# Patient Record
Sex: MALE | Race: WHITE | Employment: OTHER | URBAN - NONMETROPOLITAN AREA
[De-identification: names, ages, dates, MRNs, and addresses within clinical notes are randomized per-mention and may not be internally consistent; named-entity substitution may affect disease eponyms.]

---

## 2018-10-12 ENCOUNTER — HOSPITAL ENCOUNTER (INPATIENT)
Age: 68
LOS: 1 days | Discharge: HOME | DRG: 247 | End: 2018-10-13
Attending: FAMILY MEDICINE | Admitting: INTERNAL MEDICINE
Payer: MEDICARE

## 2018-10-12 ENCOUNTER — APPOINTMENT (OUTPATIENT)
Dept: GENERAL RADIOLOGY | Age: 68
DRG: 247 | End: 2018-10-12
Payer: MEDICARE

## 2018-10-12 DIAGNOSIS — I21.3 ST ELEVATION MYOCARDIAL INFARCTION (STEMI), UNSPECIFIED ARTERY (HCC): Primary | ICD-10-CM

## 2018-10-12 LAB
ACTIVATED CLOTTING TIME: 406 SECONDS (ref 1–150)
ALBUMIN SERPL-MCNC: 4.6 G/DL (ref 3.5–5.1)
ALP BLD-CCNC: 62 U/L (ref 38–126)
ALT SERPL-CCNC: 17 U/L (ref 11–66)
ANION GAP SERPL CALCULATED.3IONS-SCNC: 16 MEQ/L (ref 8–16)
APTT: 33.2 SECONDS (ref 22–38)
AST SERPL-CCNC: 21 U/L (ref 5–40)
BASOPHILS # BLD: 1.2 %
BASOPHILS ABSOLUTE: 0.1 THOU/MM3 (ref 0–0.1)
BILIRUB SERPL-MCNC: 0.6 MG/DL (ref 0.3–1.2)
BILIRUBIN DIRECT: < 0.2 MG/DL (ref 0–0.3)
BUN BLDV-MCNC: 18 MG/DL (ref 7–22)
CALCIUM SERPL-MCNC: 9.3 MG/DL (ref 8.5–10.5)
CHLORIDE BLD-SCNC: 98 MEQ/L (ref 98–111)
CO2: 25 MEQ/L (ref 23–33)
CREAT SERPL-MCNC: 1 MG/DL (ref 0.4–1.2)
EKG ATRIAL RATE: 66 BPM
EKG ATRIAL RATE: 74 BPM
EKG P AXIS: 32 DEGREES
EKG P AXIS: 52 DEGREES
EKG P-R INTERVAL: 182 MS
EKG P-R INTERVAL: 182 MS
EKG Q-T INTERVAL: 380 MS
EKG Q-T INTERVAL: 384 MS
EKG QRS DURATION: 82 MS
EKG QRS DURATION: 94 MS
EKG QTC CALCULATION (BAZETT): 402 MS
EKG QTC CALCULATION (BAZETT): 421 MS
EKG R AXIS: -12 DEGREES
EKG R AXIS: 27 DEGREES
EKG T AXIS: 53 DEGREES
EKG T AXIS: 63 DEGREES
EKG VENTRICULAR RATE: 66 BPM
EKG VENTRICULAR RATE: 74 BPM
EOSINOPHIL # BLD: 4.2 %
EOSINOPHILS ABSOLUTE: 0.4 THOU/MM3 (ref 0–0.4)
ERYTHROCYTE [DISTWIDTH] IN BLOOD BY AUTOMATED COUNT: 13.8 % (ref 11.5–14.5)
ERYTHROCYTE [DISTWIDTH] IN BLOOD BY AUTOMATED COUNT: 41.1 FL (ref 35–45)
ETHYL ALCOHOL, SERUM: < 0.01 %
GFR SERPL CREATININE-BSD FRML MDRD: 74 ML/MIN/1.73M2
GLUCOSE BLD-MCNC: 97 MG/DL (ref 70–108)
HCT VFR BLD CALC: 49.2 % (ref 42–52)
HEMOGLOBIN: 16 GM/DL (ref 14–18)
IMMATURE GRANS (ABS): 0.05 THOU/MM3 (ref 0–0.07)
IMMATURE GRANULOCYTES: 0.5 %
INR BLD: 0.98 (ref 0.85–1.13)
LIPASE: 43.5 U/L (ref 5.6–51.3)
LV EF: 43 %
LVEF MODALITY: NORMAL
LYMPHOCYTES # BLD: 29.3 %
LYMPHOCYTES ABSOLUTE: 2.7 THOU/MM3 (ref 1–4.8)
MAGNESIUM: 2 MG/DL (ref 1.6–2.4)
MCH RBC QN AUTO: 27 PG (ref 26–33)
MCHC RBC AUTO-ENTMCNC: 32.5 GM/DL (ref 32.2–35.5)
MCV RBC AUTO: 83.1 FL (ref 80–94)
MONOCYTES # BLD: 7.1 %
MONOCYTES ABSOLUTE: 0.7 THOU/MM3 (ref 0.4–1.3)
MRSA SCREEN RT-PCR: NEGATIVE
NUCLEATED RED BLOOD CELLS: 0 /100 WBC
OSMOLALITY CALCULATION: 279.4 MOSMOL/KG (ref 275–300)
PLATELET # BLD: 208 THOU/MM3 (ref 130–400)
PMV BLD AUTO: 9.7 FL (ref 9.4–12.4)
POC ACTIVATED CLOTTING TIME KAOLIN: 153 SECONDS (ref 1–150)
POC ACTIVATED CLOTTING TIME KAOLIN: 202 SECONDS (ref 1–150)
POC ACTIVATED CLOTTING TIME KAOLIN: 208 SECONDS (ref 1–150)
POTASSIUM SERPL-SCNC: 3.8 MEQ/L (ref 3.5–5.2)
PRO-BNP: 321.5 PG/ML (ref 0–900)
RBC # BLD: 5.92 MILL/MM3 (ref 4.7–6.1)
SEG NEUTROPHILS: 57.7 %
SEGMENTED NEUTROPHILS ABSOLUTE COUNT: 5.4 THOU/MM3 (ref 1.8–7.7)
SODIUM BLD-SCNC: 139 MEQ/L (ref 135–145)
TOTAL PROTEIN: 7.5 G/DL (ref 6.1–8)
TROPONIN T: 0.1 NG/ML
WBC # BLD: 9.3 THOU/MM3 (ref 4.8–10.8)

## 2018-10-12 PROCEDURE — 85347 COAGULATION TIME ACTIVATED: CPT

## 2018-10-12 PROCEDURE — 93010 ELECTROCARDIOGRAM REPORT: CPT | Performed by: INTERNAL MEDICINE

## 2018-10-12 PROCEDURE — C1874 STENT, COATED/COV W/DEL SYS: HCPCS

## 2018-10-12 PROCEDURE — 6370000000 HC RX 637 (ALT 250 FOR IP): Performed by: NURSE PRACTITIONER

## 2018-10-12 PROCEDURE — 93458 L HRT ARTERY/VENTRICLE ANGIO: CPT | Performed by: INTERNAL MEDICINE

## 2018-10-12 PROCEDURE — 6360000002 HC RX W HCPCS

## 2018-10-12 PROCEDURE — C1760 CLOSURE DEV, VASC: HCPCS

## 2018-10-12 PROCEDURE — 2500000003 HC RX 250 WO HCPCS: Performed by: INTERNAL MEDICINE

## 2018-10-12 PROCEDURE — 85610 PROTHROMBIN TIME: CPT

## 2018-10-12 PROCEDURE — 6370000000 HC RX 637 (ALT 250 FOR IP): Performed by: INTERNAL MEDICINE

## 2018-10-12 PROCEDURE — 92941 PRQ TRLML REVSC TOT OCCL AMI: CPT | Performed by: INTERNAL MEDICINE

## 2018-10-12 PROCEDURE — 2709999900 HC NON-CHARGEABLE SUPPLY

## 2018-10-12 PROCEDURE — C1769 GUIDE WIRE: HCPCS

## 2018-10-12 PROCEDURE — 85025 COMPLETE CBC W/AUTO DIFF WBC: CPT

## 2018-10-12 PROCEDURE — 2500000003 HC RX 250 WO HCPCS

## 2018-10-12 PROCEDURE — 83880 ASSAY OF NATRIURETIC PEPTIDE: CPT

## 2018-10-12 PROCEDURE — 85730 THROMBOPLASTIN TIME PARTIAL: CPT

## 2018-10-12 PROCEDURE — 96374 THER/PROPH/DIAG INJ IV PUSH: CPT

## 2018-10-12 PROCEDURE — 93005 ELECTROCARDIOGRAM TRACING: CPT | Performed by: FAMILY MEDICINE

## 2018-10-12 PROCEDURE — 2140000000 HC CCU INTERMEDIATE R&B

## 2018-10-12 PROCEDURE — 80053 COMPREHEN METABOLIC PANEL: CPT

## 2018-10-12 PROCEDURE — 99285 EMERGENCY DEPT VISIT HI MDM: CPT

## 2018-10-12 PROCEDURE — 92941 PRQ TRLML REVSC TOT OCCL AMI: CPT

## 2018-10-12 PROCEDURE — 2580000003 HC RX 258: Performed by: INTERNAL MEDICINE

## 2018-10-12 PROCEDURE — 71046 X-RAY EXAM CHEST 2 VIEWS: CPT

## 2018-10-12 PROCEDURE — G0480 DRUG TEST DEF 1-7 CLASSES: HCPCS

## 2018-10-12 PROCEDURE — 83690 ASSAY OF LIPASE: CPT

## 2018-10-12 PROCEDURE — 84484 ASSAY OF TROPONIN QUANT: CPT

## 2018-10-12 PROCEDURE — 6360000002 HC RX W HCPCS: Performed by: INTERNAL MEDICINE

## 2018-10-12 PROCEDURE — 93458 L HRT ARTERY/VENTRICLE ANGIO: CPT

## 2018-10-12 PROCEDURE — C1894 INTRO/SHEATH, NON-LASER: HCPCS

## 2018-10-12 PROCEDURE — 93306 TTE W/DOPPLER COMPLETE: CPT

## 2018-10-12 PROCEDURE — C1887 CATHETER, GUIDING: HCPCS

## 2018-10-12 PROCEDURE — 36415 COLL VENOUS BLD VENIPUNCTURE: CPT

## 2018-10-12 PROCEDURE — 6370000000 HC RX 637 (ALT 250 FOR IP)

## 2018-10-12 PROCEDURE — 6360000002 HC RX W HCPCS: Performed by: NURSE PRACTITIONER

## 2018-10-12 PROCEDURE — 99291 CRITICAL CARE FIRST HOUR: CPT | Performed by: INTERNAL MEDICINE

## 2018-10-12 PROCEDURE — 6360000004 HC RX CONTRAST MEDICATION: Performed by: INTERNAL MEDICINE

## 2018-10-12 PROCEDURE — 83735 ASSAY OF MAGNESIUM: CPT

## 2018-10-12 PROCEDURE — 87641 MR-STAPH DNA AMP PROBE: CPT

## 2018-10-12 PROCEDURE — C1725 CATH, TRANSLUMIN NON-LASER: HCPCS

## 2018-10-12 PROCEDURE — 82248 BILIRUBIN DIRECT: CPT

## 2018-10-12 PROCEDURE — 87081 CULTURE SCREEN ONLY: CPT

## 2018-10-12 RX ORDER — NITROGLYCERIN 20 MG/100ML
5 INJECTION INTRAVENOUS CONTINUOUS
Status: DISCONTINUED | OUTPATIENT
Start: 2018-10-12 | End: 2018-10-12

## 2018-10-12 RX ORDER — ASPIRIN 81 MG/1
324 TABLET, CHEWABLE ORAL ONCE
Status: COMPLETED | OUTPATIENT
Start: 2018-10-12 | End: 2018-10-12

## 2018-10-12 RX ORDER — SODIUM CHLORIDE 0.9 % (FLUSH) 0.9 %
10 SYRINGE (ML) INJECTION PRN
Status: DISCONTINUED | OUTPATIENT
Start: 2018-10-12 | End: 2018-10-13 | Stop reason: HOSPADM

## 2018-10-12 RX ORDER — ASPIRIN 81 MG/1
81 TABLET, CHEWABLE ORAL DAILY
Status: DISCONTINUED | OUTPATIENT
Start: 2018-10-13 | End: 2018-10-13 | Stop reason: HOSPADM

## 2018-10-12 RX ORDER — NITROGLYCERIN 20 MG/100ML
INJECTION INTRAVENOUS
Status: DISPENSED
Start: 2018-10-12 | End: 2018-10-12

## 2018-10-12 RX ORDER — HEPARIN SODIUM 10000 [USP'U]/100ML
10.1 INJECTION, SOLUTION INTRAVENOUS CONTINUOUS
Status: DISCONTINUED | OUTPATIENT
Start: 2018-10-12 | End: 2018-10-12

## 2018-10-12 RX ORDER — HEPARIN SODIUM 1000 [USP'U]/ML
4000 INJECTION, SOLUTION INTRAVENOUS; SUBCUTANEOUS ONCE
Status: COMPLETED | OUTPATIENT
Start: 2018-10-12 | End: 2018-10-12

## 2018-10-12 RX ORDER — ATORVASTATIN CALCIUM 80 MG/1
80 TABLET, FILM COATED ORAL NIGHTLY
Status: DISCONTINUED | OUTPATIENT
Start: 2018-10-12 | End: 2018-10-13 | Stop reason: HOSPADM

## 2018-10-12 RX ORDER — ATROPINE SULFATE 0.1 MG/ML
INJECTION INTRAVENOUS
Status: DISPENSED
Start: 2018-10-12 | End: 2018-10-12

## 2018-10-12 RX ORDER — MIDAZOLAM HYDROCHLORIDE 1 MG/ML
1 INJECTION INTRAMUSCULAR; INTRAVENOUS ONCE
Status: COMPLETED | OUTPATIENT
Start: 2018-10-12 | End: 2018-10-12

## 2018-10-12 RX ORDER — LISINOPRIL 5 MG/1
5 TABLET ORAL DAILY
Status: DISCONTINUED | OUTPATIENT
Start: 2018-10-12 | End: 2018-10-12 | Stop reason: SDUPTHER

## 2018-10-12 RX ORDER — ONDANSETRON 2 MG/ML
4 INJECTION INTRAMUSCULAR; INTRAVENOUS EVERY 6 HOURS PRN
Status: DISCONTINUED | OUTPATIENT
Start: 2018-10-12 | End: 2018-10-13 | Stop reason: HOSPADM

## 2018-10-12 RX ORDER — SODIUM CHLORIDE 0.9 % (FLUSH) 0.9 %
10 SYRINGE (ML) INJECTION EVERY 12 HOURS SCHEDULED
Status: DISCONTINUED | OUTPATIENT
Start: 2018-10-12 | End: 2018-10-13 | Stop reason: HOSPADM

## 2018-10-12 RX ORDER — ACETAMINOPHEN 325 MG/1
650 TABLET ORAL EVERY 4 HOURS PRN
Status: DISCONTINUED | OUTPATIENT
Start: 2018-10-12 | End: 2018-10-13 | Stop reason: HOSPADM

## 2018-10-12 RX ORDER — HEPARIN SODIUM 1000 [USP'U]/ML
2000 INJECTION, SOLUTION INTRAVENOUS; SUBCUTANEOUS PRN
Status: DISCONTINUED | OUTPATIENT
Start: 2018-10-12 | End: 2018-10-12

## 2018-10-12 RX ORDER — LISINOPRIL 5 MG/1
5 TABLET ORAL DAILY
Status: DISCONTINUED | OUTPATIENT
Start: 2018-10-12 | End: 2018-10-13 | Stop reason: HOSPADM

## 2018-10-12 RX ORDER — FENTANYL CITRATE 50 UG/ML
50 INJECTION, SOLUTION INTRAMUSCULAR; INTRAVENOUS ONCE
Status: COMPLETED | OUTPATIENT
Start: 2018-10-12 | End: 2018-10-12

## 2018-10-12 RX ORDER — HEPARIN SODIUM 1000 [USP'U]/ML
4000 INJECTION, SOLUTION INTRAVENOUS; SUBCUTANEOUS PRN
Status: DISCONTINUED | OUTPATIENT
Start: 2018-10-12 | End: 2018-10-12

## 2018-10-12 RX ORDER — NITROGLYCERIN 0.4 MG/1
0.4 TABLET SUBLINGUAL EVERY 5 MIN PRN
Status: DISCONTINUED | OUTPATIENT
Start: 2018-10-12 | End: 2018-10-13 | Stop reason: HOSPADM

## 2018-10-12 RX ADMIN — Medication 10 ML: at 09:07

## 2018-10-12 RX ADMIN — IOPAMIDOL 120 ML: 755 INJECTION, SOLUTION INTRAVENOUS at 06:17

## 2018-10-12 RX ADMIN — ASPIRIN 81 MG CHEWABLE TABLET 324 MG: 81 TABLET CHEWABLE at 03:58

## 2018-10-12 RX ADMIN — NITROGLYCERIN 0.4 MG: 0.4 TABLET, ORALLY DISINTEGRATING SUBLINGUAL at 04:15

## 2018-10-12 RX ADMIN — ACETAMINOPHEN 650 MG: 325 TABLET ORAL at 15:59

## 2018-10-12 RX ADMIN — NITROGLYCERIN 0.4 MG: 0.4 TABLET, ORALLY DISINTEGRATING SUBLINGUAL at 04:20

## 2018-10-12 RX ADMIN — NITROGLYCERIN 5 MCG/MIN: 20 INJECTION INTRAVENOUS at 09:32

## 2018-10-12 RX ADMIN — METOPROLOL TARTRATE 25 MG: 25 TABLET ORAL at 21:15

## 2018-10-12 RX ADMIN — NITROGLYCERIN 0.4 MG: 0.4 TABLET, ORALLY DISINTEGRATING SUBLINGUAL at 04:10

## 2018-10-12 RX ADMIN — HEPARIN SODIUM 4000 UNITS: 1000 INJECTION, SOLUTION INTRAVENOUS; SUBCUTANEOUS at 04:51

## 2018-10-12 RX ADMIN — TICAGRELOR 90 MG: 90 TABLET ORAL at 21:22

## 2018-10-12 RX ADMIN — LISINOPRIL 5 MG: 5 TABLET ORAL at 21:15

## 2018-10-12 RX ADMIN — FENTANYL CITRATE 50 MCG: 50 INJECTION INTRAMUSCULAR; INTRAVENOUS at 11:16

## 2018-10-12 RX ADMIN — MIDAZOLAM HYDROCHLORIDE 1 MG: 2 INJECTION, SOLUTION INTRAMUSCULAR; INTRAVENOUS at 11:20

## 2018-10-12 RX ADMIN — ATORVASTATIN CALCIUM 80 MG: 80 TABLET, FILM COATED ORAL at 21:15

## 2018-10-12 RX ADMIN — METOPROLOL TARTRATE 25 MG: 25 TABLET ORAL at 09:04

## 2018-10-12 RX ADMIN — HEPARIN SODIUM AND DEXTROSE 10.1 UNITS/KG/HR: 10000; 5 INJECTION INTRAVENOUS at 04:53

## 2018-10-12 ASSESSMENT — ENCOUNTER SYMPTOMS
FACIAL SWELLING: 0
VOMITING: 0
ABDOMINAL PAIN: 0
BACK PAIN: 0
COUGH: 1
NAUSEA: 0
SHORTNESS OF BREATH: 0
ABDOMINAL DISTENTION: 0
SINUS PRESSURE: 0
SINUS PAIN: 0

## 2018-10-12 ASSESSMENT — PAIN SCALES - GENERAL
PAINLEVEL_OUTOF10: 4
PAINLEVEL_OUTOF10: 0
PAINLEVEL_OUTOF10: 8

## 2018-10-12 ASSESSMENT — PAIN DESCRIPTION - LOCATION: LOCATION: CHEST

## 2018-10-12 ASSESSMENT — PAIN DESCRIPTION - FREQUENCY: FREQUENCY: CONTINUOUS

## 2018-10-12 ASSESSMENT — PAIN DESCRIPTION - PAIN TYPE: TYPE: ACUTE PAIN

## 2018-10-12 NOTE — PROGRESS NOTES
Pharmacy Medication History Note      List of current medications patient is taking is complete. Source of information: Patient    Changes made to medication list:  Medications removed (include reason, ex. therapy complete or physician discontinued):  · None  · None    Medications added/doses adjusted:  · None  · None    Other notes (ex. Recent course of antibiotics, Coumadin dosing):  · Denies use of other OTC or herbal medications.       Allergies reviewed      Electronically signed by Lin Lugo on 10/12/2018 at 1:09 PM

## 2018-10-12 NOTE — ED PROVIDER NOTES
[facility]  eMERGENCY dEPARTMENT eNCOUnter   Physician Attestation    Pt Name: Nguyễn Dominguez  MRN: [id number]  Maria Elenagfmarco a 1950  Date of evaluation: 10/12/18        Physician Note:    Evaluation:  I have personally performed a face-to-face evaluation on this patient. I have reviewed the Midlevel's findings and agree. History and exam by me shows the following information. Patient's anterior chest pressure for the last  2 days    Uses chewing tobacco, has a family history of coronary disease with a brother having CAD    With worse pain tonight and some sweating he came to ER    EKG #1 showed sinus rhythm with rate 74. QRS complexes show -12° axis, normal conduction. QS waves V1 and V2 possible old septal MI. ST-T waves show no acute change    EKG #2 70 minutes after the first EKG, showed sinus rhythm with rate 66. QRS complexes show normal axis, normal conduction. ST-T waves show ST segment elevation in inferior leads consistent with an acute STEMI    Renal function was normal    Troponin returned elevated 0.105    Patient presented with crescendo angina, elevated troponin, now showing STEMI    he received aspirin, started on heparin bolus and drip    The remaining sinus rhythm with good blood pressure    With inferior  MI did not start nitro    STEMI alert initiated    he'll be taken to Cath Lab by cardiology                      1. ST elevation myocardial infarction (STEMI), unspecified artery ([facility] [address]          DISPOSITION/PLAN  PATIENT REFERRED TO:  No follow-up provider specified.   DISCHARGE MEDICATIONS:  New Prescriptions    No medications on file         MD Adolph Collier MD  10/12/18 6555
condition and they did verbalize understanding. Patient was seen independently by myself. The Patient's final impression and disposition and plan was determined by myself. CRITICAL CARE:   There was a high probability of clinically significant/life threatening deterioration in this patient's condition which required my urgent intervention. Total critical care time was 30 minutes. This excludes any time for separately reportable procedures. CONSULTS:  None    PROCEDURES:  None    FINAL IMPRESSION      1. ST elevation myocardial infarction (STEMI), unspecified artery ([facility] [address]          DISPOSITION/PLAN   DISPOSITION Decision To Admit 10/12/2018 04:42:26 AM        PATIENT REFERRED TO:  No follow-up provider specified.     DISCHARGE MEDICATIONS:  New Prescriptions    No medications on file       (Please note that portions of this note were completed with a voice recognition program.  Efforts were made to edit thedictations but occasionally words are mis-transcribed.)    OLIVIER Alex - OLIVIER Knox CNP  10/12/18 4375

## 2018-10-12 NOTE — H&P
[facility]  H and P    Patient's Name/Date of Birth: Chapin Alston / 1950 (63 y.o.)    Date: October 12, 2018     Referring Provider: Dinorah Hernandez MD    CHIEF COMPLAINT: CP      HPI: This is a pleasant 80 y.o. male presents with chest pain. No prior medical history, takes no medications, does not follow with PCP, who presents with chest discomfort and ECG changes concerning for inferior STEMI. 95% dominant LCX stenosis with patent co-dominant RCA, and 50-60% LAD stenosis. S/p uncomplicated PCI. RFA with sheath in place. DAPT with ASA/Brillinta. BP elevated. No current chest pain, angina, FLOWER, orthopnea, PND, sob at rest, palpitations, LE edema, or syncope. Echo: No results found for this or any previous visit.      All labs, EKG's, diagnostic testing and images as well as cardiac cath, stress testing were reviewed during this encounter    Past Medical History:   Diagnosis Date    Arthritis      Past Surgical History:   Procedure Laterality Date    APPENDECTOMY       Current Facility-Administered Medications   Medication Dose Route Frequency Provider Last Rate Last Dose    nitroGLYCERIN (NITROSTAT) SL tablet 0.4 mg  0.4 mg Sublingual Q5 Min PRN OLIVIER Ross - CNP   0.4 mg at 10/12/18 0420    sodium chloride flush 0.9 % injection 10 mL  10 mL Intravenous 2 times per day Dinorah Hernandez MD   10 mL at 10/12/18 7202    sodium chloride flush 0.9 % injection 10 mL  10 mL Intravenous PRN Dinorah Hernandez MD        acetaminophen (TYLENOL) tablet 650 mg  650 mg Oral Q4H PRN Dinorah Hernandez MD        magnesium hydroxide (MILK OF MAGNESIA) 400 MG/5ML suspension 30 mL  30 mL Oral Daily PRN Dinorah Hernandez MD        ondansetron [facility]) injection 4 mg  4 mg Intravenous Q6H PRN Dinorah Hernandez MD        atorvastatin (LIPITOR) tablet 80 mg  80 mg Oral Nightly Dinorah Hernandez MD        [START ON 10/13/2018] aspirin chewable tablet 81 mg  81 mg Oral Daily

## 2018-10-12 NOTE — BRIEF OP NOTE
[facility]  Sedation/Analgesia Post Sedation Record    Pt Name: Fidel Orellana  Account number: [de-identified]  MRN: [id number]  YOB: 1950  Procedure Performed By: Lyndsey Puri, [address]  Primary Care Physician: No primary care provider on file. Date: 10/12/2018    POST-PROCEDURE    Physicians/Assistants: Lyndsey Puri, KOJO    Procedure Performed:Cath/ PCI/LV gram      Sedation/Anesthesia: Versed/ Fentanyl and 2% xylocaine local anesthesia. Estimated Blood Loss: < 50 ml. Specimens Removed: None         Disposition of Specimen: N/A        Complications: No Immediate Complications.        Post-procedure Diagnosis/Findings:      LCX to PDA with 95% subtotal stenosis  LAD with proximal 50-60% stenosis  RCA with mild luminal irregularities  Co-dominant system    DAPT  OMT  Post-STEMI GDMT  TTE   Bedrest x 4 hours after sheath removed      Lyndsey Puri, KOJO  Electronically signed 10/12/2018 at 6:04 AM  Interventional Cardiology

## 2018-10-13 ENCOUNTER — TELEPHONE (OUTPATIENT)
Dept: CARDIOLOGY CLINIC | Age: 68
End: 2018-10-13

## 2018-10-13 VITALS
TEMPERATURE: 97.9 F | RESPIRATION RATE: 14 BRPM | WEIGHT: 211.4 LBS | SYSTOLIC BLOOD PRESSURE: 114 MMHG | OXYGEN SATURATION: 95 % | HEART RATE: 72 BPM | HEIGHT: 68 IN | DIASTOLIC BLOOD PRESSURE: 73 MMHG | BODY MASS INDEX: 32.04 KG/M2

## 2018-10-13 PROCEDURE — B2111ZZ FLUOROSCOPY OF MULTIPLE CORONARY ARTERIES USING LOW OSMOLAR CONTRAST: ICD-10-PCS | Performed by: INTERNAL MEDICINE

## 2018-10-13 PROCEDURE — B2151ZZ FLUOROSCOPY OF LEFT HEART USING LOW OSMOLAR CONTRAST: ICD-10-PCS | Performed by: INTERNAL MEDICINE

## 2018-10-13 PROCEDURE — 6370000000 HC RX 637 (ALT 250 FOR IP): Performed by: NURSE PRACTITIONER

## 2018-10-13 PROCEDURE — 027034Z DILATION OF CORONARY ARTERY, ONE ARTERY WITH DRUG-ELUTING INTRALUMINAL DEVICE, PERCUTANEOUS APPROACH: ICD-10-PCS | Performed by: INTERNAL MEDICINE

## 2018-10-13 PROCEDURE — 99239 HOSP IP/OBS DSCHRG MGMT >30: CPT | Performed by: NURSE PRACTITIONER

## 2018-10-13 PROCEDURE — 4A023N7 MEASUREMENT OF CARDIAC SAMPLING AND PRESSURE, LEFT HEART, PERCUTANEOUS APPROACH: ICD-10-PCS | Performed by: INTERNAL MEDICINE

## 2018-10-13 PROCEDURE — 6370000000 HC RX 637 (ALT 250 FOR IP): Performed by: INTERNAL MEDICINE

## 2018-10-13 PROCEDURE — 2580000003 HC RX 258: Performed by: INTERNAL MEDICINE

## 2018-10-13 RX ORDER — ASPIRIN 81 MG/1
81 TABLET, CHEWABLE ORAL DAILY
Qty: 30 TABLET | Refills: 0 | COMMUNITY
Start: 2018-10-13

## 2018-10-13 RX ORDER — ATORVASTATIN CALCIUM 80 MG/1
80 TABLET, FILM COATED ORAL NIGHTLY
Qty: 30 TABLET | Refills: 3 | Status: SHIPPED | OUTPATIENT
Start: 2018-10-13 | End: 2019-02-01 | Stop reason: SDUPTHER

## 2018-10-13 RX ORDER — LISINOPRIL 5 MG/1
5 TABLET ORAL DAILY
Qty: 30 TABLET | Refills: 3 | Status: SHIPPED | OUTPATIENT
Start: 2018-10-13 | End: 2019-02-01 | Stop reason: SDUPTHER

## 2018-10-13 RX ORDER — NITROGLYCERIN 0.4 MG/1
TABLET SUBLINGUAL
Qty: 25 TABLET | Refills: 1 | Status: SHIPPED | OUTPATIENT
Start: 2018-10-13 | End: 2019-10-30 | Stop reason: SDUPTHER

## 2018-10-13 RX ADMIN — ASPIRIN 81 MG CHEWABLE TABLET 81 MG: 81 TABLET CHEWABLE at 08:22

## 2018-10-13 RX ADMIN — LISINOPRIL 5 MG: 5 TABLET ORAL at 08:22

## 2018-10-13 RX ADMIN — METOPROLOL TARTRATE 25 MG: 25 TABLET ORAL at 08:22

## 2018-10-13 RX ADMIN — TICAGRELOR 90 MG: 90 TABLET ORAL at 08:22

## 2018-10-13 RX ADMIN — Medication 10 ML: at 08:22

## 2018-10-13 ASSESSMENT — PAIN SCALES - GENERAL
PAINLEVEL_OUTOF10: 0
PAINLEVEL_OUTOF10: 0

## 2018-10-13 ASSESSMENT — PAIN DESCRIPTION - PAIN TYPE: TYPE: ACUTE PAIN

## 2018-10-13 NOTE — PLAN OF CARE
Problem: Falls - Risk of:  Goal: Will remain free from falls  Will remain free from falls   Outcome: Ongoing  Assessment & interventions provided throughout shift. Bed locked & in low position, call light in reach, side-rails up x2, non-slip socks on when ambulating, reminded patient to use call light to call for assistance. Goal: Absence of physical injury  Absence of physical injury   Outcome: Ongoing  Assessment & interventions provided throughout shift. Bed locked & in low position, call light in reach, side-rails up x2, non-slip socks on when ambulating, reminded patient to use call light to call for assistance. Problem: Discharge Planning:  Goal: Discharged to appropriate level of care  Discharged to appropriate level of care   Outcome: Ongoing  Patient plans to be discharged home    Problem: Pain - Acute:  Goal: Pain level will decrease  Pain level will decrease   Outcome: Ongoing  Patient denies pain at this time    Problem: Anxiety:  Goal: Level of anxiety will decrease  Level of anxiety will decrease   Outcome: Ongoing  Patient denies anxiety at this time    Problem: Cardiac Output - Decreased:  Goal: Cardiac output within specified parameters  Cardiac output within specified parameters   Outcome: Ongoing  Ongoing assessment & interventions provided throughout shift. Patient on continuous telemetry monitoring, heart tones, vitals & pulses checked at least 3 times per shift & PRN. Vitals within acceptable limits. Peripheral pulses palpable. Problem: Tissue Perfusion - Peripheral, Altered:  Goal: Absence of hematoma at arterial access site  Absence of hematoma at arterial access site   Outcome: Ongoing  No hematoma at this time     Problem: Tobacco Use:  Goal: Will participate in inpatient tobacco-use cessation counseling  Will participate in inpatient tobacco-use cessation counseling   Outcome: Ongoing      Comments: Care plan reviewed with patient.   Patient verbalizes understanding of the

## 2018-10-13 NOTE — PROGRESS NOTES
Cardiology Progress Note      Patient:  Erick Constantino  YOB: 1950  MRN: [id number]   Acct: [de-identified]  [facility] Date:  10/12/2018  Primary Cardiologist: none  Seen by Dr. David Caballero     Per prior cardiology consult note-  CHIEF COMPLAINT: CP        HPI: This is a pleasant 80 y.o. male presents with chest pain. No prior medical history, takes no medications, does not follow with PCP, who presents with chest discomfort and ECG changes concerning for inferior STEMI. 95% dominant LCX stenosis with patent co-dominant RCA, and 50-60% LAD stenosis. S/p uncomplicated PCI. RFA with sheath in place. DAPT with ASA/Brillinta. BP elevated. No current chest pain, angina, FLOWER, orthopnea, PND, sob at rest, palpitations, LE edema, or syncope.         Subjective (Events in last 24 hours):     Up in chair   No cardiac c/o  Tele SR no ectopy  VSS    RT groin cath site dressing dry and intact - no ecchymosis or hematoma - PPP      Objective:   /75   Pulse 73   Temp 98 °F (36.7 °C) (Oral)   Resp 16   Ht 5' 7.5\" (1.715 m)   Wt 211 lb 6.4 oz (95.9 kg)   SpO2 94%   BMI 32.62 kg/m²        TELEMETRY: SR    Physical Exam:  General Appearance: alert and oriented to person, place and time, in no acute distress  Cardiovascular: normal rate, regular rhythm, normal S1 and S2, no murmurs, rubs, clicks, or gallops, distal pulses intact,  Pulmonary/Chest: clear to auscultation bilaterally- no wheezes, rales or rhonchi, normal air movement, no respiratory distress  Abdomen: soft, non-tender, non-distended, normal bowel sounds, no masses Extremities: no cyanosis, clubbing or edema, radial and pedal pulses present   Skin: warm and dry, no rash or erythema  Head: normocephalic and atraumatic  Musculoskeletal: normal range of motion, no joint swelling, deformity or tenderness  Neurological: alert, oriented, normal speech, no focal findings or movement disorder noted    Medications:    sodium chloride flush  10 mL Intravenous 2 times per day    atorvastatin  80 mg Oral Nightly    aspirin  81 mg Oral Daily    ticagrelor  90 mg Oral BID    metoprolol tartrate  25 mg Oral BID    lisinopril  5 mg Oral Daily         nitroGLYCERIN 0.4 mg Q5 Min PRN   sodium chloride flush 10 mL PRN   acetaminophen 650 mg Q4H PRN   magnesium hydroxide 30 mL Daily PRN   ondansetron 4 mg Q6H PRN       Diagnostics:  EK-OCT-2018 04:43:24 [facility] ROUTINE RETRIEVAL  Normal sinus rhythm  ST elevation consider inferolateral injury or acute infarct  **  ACUTE MI / STEMI **  Consider right ventricular involvement in acute inferior infarct  Abnormal ECG  When compared with ECG of 12-OCT-2018 03:34,  ST elevation now present in Inferior leads  ST no longer depressed in Lateral leads  Confirmed by LISET Hernandez ([id number]) on 10/12/2018 3:07:23 PM    Echo:   Electronically signed by Muna Cobos MD (Interpreting   physician) on 10/12/2018 at 03:22 PM   ----------------------------------------------------------------      Findings      Mitral Valve   The mitral valve structure was normal with normal leaflet separation.   DOPPLER: The transmitral velocity was within the normal range with no   evidence for mitral stenosis. There was mild mitral regurgitation.      Aortic Valve   The aortic valve was trileaflet with normal thickness and cuspal   separation. DOPPLER: Transaortic velocity was within the normal range with   no evidence of aortic stenosis. There was no evidence of aortic   regurgitation.      Tricuspid Valve   The tricuspid valve structure was normal with normal leaflet separation.   DOPPLER: There was no evidence of tricuspid stenosis. There was no   evidence of tricuspid regurgitation.      Pulmonic Valve   The pulmonic valve leaflets were not well seen.  DOPPLER: The transpulmonic   velocity was within the normal range with no evidence for regurgitation.      Left Atrium   Left atrial size was normal.      Left Ventricle   Normal left

## 2018-10-14 LAB
MRSA SCREEN: NORMAL
VRE CULTURE: NORMAL

## 2018-10-15 NOTE — CARE COORDINATION
Late entry  10/15/18, 6:51 AM    Home alone. Denies needs. Discharge plan discussed by  and . Discharge plan reviewed with patient/ family. Patient/ family verbalize understanding of discharge plan and are in agreement with plan. Understanding was demonstrated using the teach back method.        IMM Letter  IMM Letter given to Patient/Family/Significant other/Guardian/POA/by[de-identified] Staff  IMM Letter date given[de-identified] 10/13/18  IMM Letter time given[de-identified] 9895
PCP, but is interested in getting established with a PCP. List of PCPs accepting new patients given to patient to review. Zaira Cruz states he plans to return home at discharge, denies needs, and declines [place] stating he doesn't need it. Zaira Roots also declines Meds to [facility] program at this time.       Evaluation: no

## 2018-10-16 ENCOUNTER — CARE COORDINATION (OUTPATIENT)
Dept: CASE MANAGEMENT | Age: 68
End: 2018-10-16

## 2018-10-17 ENCOUNTER — TELEPHONE (OUTPATIENT)
Dept: ADMINISTRATIVE | Age: 68
End: 2018-10-17

## 2018-10-17 NOTE — TELEPHONE ENCOUNTER
Patient left an after hours voicemail today 10/17/18 @ 7:19 am.  He is returning this call. Please advise.

## 2018-10-26 ENCOUNTER — CARE COORDINATION (OUTPATIENT)
Dept: CASE MANAGEMENT | Age: 68
End: 2018-10-26

## 2018-10-26 NOTE — CARE COORDINATION
[facility] Transitions Follow Up Call    10/26/2018    Patient: Casimiro Khan  Patient : 1950   MRN: <[id number]>  Reason for Admission: There are no discharge diagnoses documented for the most recent discharge. Discharge Date: 10/13/18 RARS: Readmission Risk Score: 7       Spoke with: [facility] Transitions Subsequent and Final Call    Subsequent and Final Calls  Do you have any ongoing symptoms?:  No  Do you have any questions related to your medications?:  No  Do you currently have any active services?:  No  Do you have any needs or concerns that I can assist you with?:  No  Care Transitions Interventions  Other Interventions: Follow Up : Spoke with patient who said he is doing well, denies any chest pain or cardiac symptoms. Pt said his groin site is completely healed . States he is still feeling a little tired but other than that is doing well.  Will continue to follow   Future Appointments  Date Time Provider Robert Kaplan   10/30/2018 9:00 AM OLIVIER Booker - CNP  [facility] - Cipriano Hendricks RN

## 2018-10-30 ENCOUNTER — OFFICE VISIT (OUTPATIENT)
Dept: CARDIOLOGY CLINIC | Age: 68
End: 2018-10-30
Payer: MEDICARE

## 2018-10-30 VITALS
DIASTOLIC BLOOD PRESSURE: 82 MMHG | WEIGHT: 220 LBS | SYSTOLIC BLOOD PRESSURE: 122 MMHG | BODY MASS INDEX: 33.34 KG/M2 | HEIGHT: 68 IN | HEART RATE: 60 BPM

## 2018-10-30 DIAGNOSIS — Z72.0 TOBACCO ABUSE: ICD-10-CM

## 2018-10-30 DIAGNOSIS — Z95.5 S/P DRUG ELUTING CORONARY STENT PLACEMENT: Primary | ICD-10-CM

## 2018-10-30 DIAGNOSIS — I25.2 HISTORY OF ST ELEVATION MYOCARDIAL INFARCTION (STEMI): ICD-10-CM

## 2018-10-30 DIAGNOSIS — I25.5 ISCHEMIC CARDIOMYOPATHY: ICD-10-CM

## 2018-10-30 PROCEDURE — 1101F PT FALLS ASSESS-DOCD LE1/YR: CPT | Performed by: NURSE PRACTITIONER

## 2018-10-30 PROCEDURE — 1123F ACP DISCUSS/DSCN MKR DOCD: CPT | Performed by: NURSE PRACTITIONER

## 2018-10-30 PROCEDURE — 1111F DSCHRG MED/CURRENT MED MERGE: CPT | Performed by: NURSE PRACTITIONER

## 2018-10-30 PROCEDURE — 99213 OFFICE O/P EST LOW 20 MIN: CPT | Performed by: NURSE PRACTITIONER

## 2018-10-30 PROCEDURE — 4004F PT TOBACCO SCREEN RCVD TLK: CPT | Performed by: NURSE PRACTITIONER

## 2018-10-30 PROCEDURE — 4040F PNEUMOC VAC/ADMIN/RCVD: CPT | Performed by: NURSE PRACTITIONER

## 2018-10-30 PROCEDURE — G8598 ASA/ANTIPLAT THER USED: HCPCS | Performed by: NURSE PRACTITIONER

## 2018-10-30 PROCEDURE — G8417 CALC BMI ABV UP PARAM F/U: HCPCS | Performed by: NURSE PRACTITIONER

## 2018-10-30 PROCEDURE — G8427 DOCREV CUR MEDS BY ELIG CLIN: HCPCS | Performed by: NURSE PRACTITIONER

## 2018-10-30 PROCEDURE — G8484 FLU IMMUNIZE NO ADMIN: HCPCS | Performed by: NURSE PRACTITIONER

## 2018-10-30 PROCEDURE — 3017F COLORECTAL CA SCREEN DOC REV: CPT | Performed by: NURSE PRACTITIONER

## 2018-10-30 NOTE — PROGRESS NOTES
take-off  of a large OM branch. OM-1 has no significant obstructive disease. OM-2 has no significant obstructive disease.     RCA:  RCA has no significant obstructive disease. Bifurcates into the  PLV and PDA. Both have no significant obstructive disease. RCA is  codominant. LV:  LV systolic pressure 048. There was no significant LV to AO  systolic gradient. The LVEDP is 55% to 60%. There is no significant  wall motion abnormality. The aortic valve appears tricuspid. There  is no aneurysm or dissection of the visualized portion of the  ascending aorta.     SUMMARY:  Successful PCI of the LCX to PDA; preserved EF; residual mild LAD  disease. Diagnosis Orders   1. S/P drug eluting coronary stent placement     2. History of ST elevation myocardial infarction (STEMI)     3. Ischemic cardiomyopathy     4. Tobacco abuse       On BB, statin, asa, brilinta post PCI/MARIA TERESA  Refused cardiac rehab  Uses smokeless tobacco-encouraged cessation  Has no PCP, list of PCP's given and encouraged to become established with one. No orders of the defined types were placed in this encounter.       Discussed use, benefit, and side effects of prescribed medications. All patient questions answered. Pt voiced understanding. Instructed to continue current medications, diet and exercise. Continue risk factor modification and medical management. Patient agreed with treatment plan. Follow up as directed.     Follow up with Dr Kelsi Fox as scheduled or sooner if needed

## 2018-11-08 ENCOUNTER — CARE COORDINATION (OUTPATIENT)
Dept: CASE MANAGEMENT | Age: 68
End: 2018-11-08

## 2018-11-20 ENCOUNTER — CARE COORDINATION (OUTPATIENT)
Dept: CARE COORDINATION | Age: 68
End: 2018-11-20

## 2018-12-21 ENCOUNTER — CARE COORDINATION (OUTPATIENT)
Dept: CASE MANAGEMENT | Age: 68
End: 2018-12-21

## 2018-12-21 NOTE — CARE COORDINATION
[facility] Transitions Follow Up Call    2018    Patient: Fidel Orellana  Patient : 1950   MRN: <[id number]>  Reason for Admission: There are no discharge diagnoses documented for the most recent discharge. Discharge Date: 10/13/18 RARS: Readmission Risk Score: 7       Spoke with: Patient    Care Transitions Subsequent and Final Call    Subsequent and Final Calls  Care Transitions Interventions  Other Interventions: Follow Up  Spoke with patient. He states that he is feeling good. He has F/U with his PCP, He has all Rx and is taking them as directed. He states that he declined cardiac rehab. He states that he gets \" plenty of exercise\" Patient states that he rides a bike 2-3 x a week. Patient had no questions or concerns. No future appointments.     Esperanza Mcclelland RN

## 2019-01-15 ENCOUNTER — CARE COORDINATION (OUTPATIENT)
Dept: CASE MANAGEMENT | Age: 69
End: 2019-01-15

## 2019-02-02 RX ORDER — LISINOPRIL 5 MG/1
5 TABLET ORAL DAILY
Qty: 30 TABLET | Refills: 2 | Status: SHIPPED | OUTPATIENT
Start: 2019-02-02 | End: 2019-04-01

## 2019-02-02 RX ORDER — ATORVASTATIN CALCIUM 80 MG/1
80 TABLET, FILM COATED ORAL NIGHTLY
Qty: 30 TABLET | Refills: 2 | Status: SHIPPED | OUTPATIENT
Start: 2019-02-02 | End: 2019-05-15 | Stop reason: SDUPTHER

## 2019-04-01 ENCOUNTER — OFFICE VISIT (OUTPATIENT)
Dept: CARDIOLOGY CLINIC | Age: 69
End: 2019-04-01
Payer: MEDICARE

## 2019-04-01 VITALS
SYSTOLIC BLOOD PRESSURE: 138 MMHG | HEIGHT: 68 IN | HEART RATE: 57 BPM | DIASTOLIC BLOOD PRESSURE: 94 MMHG | BODY MASS INDEX: 33.65 KG/M2 | WEIGHT: 222 LBS

## 2019-04-01 DIAGNOSIS — I10 ESSENTIAL HYPERTENSION: ICD-10-CM

## 2019-04-01 DIAGNOSIS — R06.02 SOB (SHORTNESS OF BREATH): Primary | ICD-10-CM

## 2019-04-01 DIAGNOSIS — I25.2 HISTORY OF ST ELEVATION MYOCARDIAL INFARCTION (STEMI): ICD-10-CM

## 2019-04-01 DIAGNOSIS — I25.5 ISCHEMIC CARDIOMYOPATHY: ICD-10-CM

## 2019-04-01 DIAGNOSIS — Z95.5 S/P DRUG ELUTING CORONARY STENT PLACEMENT: ICD-10-CM

## 2019-04-01 PROCEDURE — G8427 DOCREV CUR MEDS BY ELIG CLIN: HCPCS | Performed by: INTERNAL MEDICINE

## 2019-04-01 PROCEDURE — 1123F ACP DISCUSS/DSCN MKR DOCD: CPT | Performed by: INTERNAL MEDICINE

## 2019-04-01 PROCEDURE — 4004F PT TOBACCO SCREEN RCVD TLK: CPT | Performed by: INTERNAL MEDICINE

## 2019-04-01 PROCEDURE — 99214 OFFICE O/P EST MOD 30 MIN: CPT | Performed by: INTERNAL MEDICINE

## 2019-04-01 PROCEDURE — G8599 NO ASA/ANTIPLAT THER USE RNG: HCPCS | Performed by: INTERNAL MEDICINE

## 2019-04-01 PROCEDURE — 3017F COLORECTAL CA SCREEN DOC REV: CPT | Performed by: INTERNAL MEDICINE

## 2019-04-01 PROCEDURE — 93000 ELECTROCARDIOGRAM COMPLETE: CPT | Performed by: INTERNAL MEDICINE

## 2019-04-01 PROCEDURE — 4040F PNEUMOC VAC/ADMIN/RCVD: CPT | Performed by: INTERNAL MEDICINE

## 2019-04-01 PROCEDURE — G8417 CALC BMI ABV UP PARAM F/U: HCPCS | Performed by: INTERNAL MEDICINE

## 2019-04-01 RX ORDER — LOSARTAN POTASSIUM 25 MG/1
25 TABLET ORAL DAILY
Qty: 90 TABLET | Refills: 1 | Status: SHIPPED | OUTPATIENT
Start: 2019-04-01 | End: 2019-10-11 | Stop reason: SDUPTHER

## 2019-04-01 RX ORDER — POTASSIUM CHLORIDE 20 MEQ/1
20 TABLET, EXTENDED RELEASE ORAL DAILY
Qty: 90 TABLET | Refills: 1 | Status: SHIPPED | OUTPATIENT
Start: 2019-04-01 | End: 2019-09-24 | Stop reason: SDUPTHER

## 2019-04-01 RX ORDER — FUROSEMIDE 20 MG/1
20 TABLET ORAL 2 TIMES DAILY
Qty: 60 TABLET | Refills: 3 | Status: SHIPPED | OUTPATIENT
Start: 2019-04-01 | End: 2019-07-31 | Stop reason: SDUPTHER

## 2019-04-01 NOTE — PROGRESS NOTES
[address] CARDIOLOGY  [facility] [address]  [place] [address]  Dept: [phone]  Dept Fax: [phone]  Loc: [phone]    Visit Date: 4/1/2019    Mr. Joan Weiss is a 80 y.o. male  who presented for:  Chief Complaint   Patient presents with    Other     lisinopril, has a cough and bringing up mucus    Cardiac Clearance     dental, 4 teeth extraction, [facility], date TBD    Shortness of Breath     with exertion    Chest Pain    Headache       HPI:   HPI   81 yo M presents after STEMI 10/2018 s/p PCI of the LCX to [facility] for follow-up. No chest pain, angina, FLOWER, orthopnea, PND, sob at rest, palpitations, LE edema, or syncope. He has been having a cough since starting the Lisinopril with some mild mucus as well. No f/c/ns. ECG shows SB. Needs to stop DAPT for teeth pulls. EF 40-45%,mild MR. Current Outpatient Medications:     metoprolol tartrate (LOPRESSOR) 25 MG tablet, Take 1 tablet by mouth 2 times daily, Disp: 60 tablet, Rfl: 2    atorvastatin (LIPITOR) 80 MG tablet, Take 1 tablet by mouth nightly, Disp: 30 tablet, Rfl: 2    ticagrelor (BRILINTA) 90 MG TABS tablet, Take 1 tablet by mouth 2 times daily, Disp: 60 tablet, Rfl: 2    lisinopril (PRINIVIL;ZESTRIL) 5 MG tablet, Take 1 tablet by mouth daily, Disp: 30 tablet, Rfl: 2    aspirin 81 MG chewable tablet, Take 1 tablet by mouth daily, Disp: 30 tablet, Rfl: 0    nitroGLYCERIN (NITROSTAT) 0.4 MG SL tablet, up to max of 3 total doses. If no relief after 1 dose, call 911., Disp: 25 tablet, Rfl: 1    Past Medical History  Amilcar Weber  has a past medical history of Arthritis. Social History  Amilcar Weber  reports that he quit smoking about 40 years ago. His smokeless tobacco use includes chew. He reports that he does not drink alcohol or use drugs. Family History  Amilcar Weber family history includes Cancer in his brother; Coronary Art Dis in his brother and father.     There is no family history of bicuspid aortic valve, aneurysms, heart transplant, pacemakers, defibrillators, or sudden cardiac death. Past Surgical History   Past Surgical History:   Procedure Laterality Date    APPENDECTOMY         Review of Systems   Constitutional: Negative for chills and fever  HENT: Negative for congestion, sinus pressure, sneezing and sore throat. Eyes: Negative for pain, discharge, redness and itching. Respiratory: Negative for apnea, cough  Gastrointestinal: Negative for blood in stool, constipation, diarrhea   Endocrine: Negative for cold intolerance, heat intolerance, polydipsia. Genitourinary: Negative for dysuria, enuresis, flank pain and hematuria. Musculoskeletal: Negative for arthralgias, joint swelling and neck pain. Neurological: Negative for numbness and headaches. Psychiatric/Behavioral: Negative for agitation, confusion, decreased concentration and dysphoric mood. Objective:     BP (!) 138/94   Pulse 57   Ht 5' 7.5\" (1.715 m)   Wt 222 lb (100.7 kg)   BMI 34.26 kg/m²     Wt Readings from Last 3 Encounters:   04/01/19 222 lb (100.7 kg)   10/30/18 220 lb (99.8 kg)   10/13/18 211 lb 6.4 oz (95.9 kg)     BP Readings from Last 3 Encounters:   04/01/19 (!) 138/94   10/30/18 122/82   10/13/18 114/73       Nursing note and vitals reviewed. Physical Exam   Constitutional: Oriented to person, place, and time. Appears well-developed and well-nourished. HENT:   Head: Normocephalic and atraumatic. Eyes: EOM are normal. Pupils are equal, round, and reactive to light. Neck: Normal range of motion. Neck supple. No JVD present. Cardiovascular: Normal rate, regular rhythm, normal heart sounds and intact distal pulses. No murmur heard. Pulmonary/Chest: Effort normal and breath sounds normal. No respiratory distress. No wheezes. No rales. Abdominal: Soft. Bowel sounds are normal. No distension. There is no tenderness. Musculoskeletal: Normal range of motion. No edema.    Neurological: Alert and oriented to person, place, and time. No cranial nerve deficit. Coordination normal.   Skin: Skin is warm and dry. Psychiatric: Normal mood and affect.        No results found for: CKTOTAL, CKMB, CKMBINDEX    Lab Results   Component Value Date    WBC 9.3 10/12/2018    RBC 5.92 10/12/2018    HGB 16.0 10/12/2018    HCT 49.2 10/12/2018    MCV 83.1 10/12/2018    MCH 27.0 10/12/2018    MCHC 32.5 10/12/2018     10/12/2018    MPV 9.7 10/12/2018       Lab Results   Component Value Date     10/12/2018    K 3.8 10/12/2018    CL 98 10/12/2018    CO2 25 10/12/2018    BUN 18 10/12/2018    LABALBU 4.6 10/12/2018    CREATININE 1.0 10/12/2018    CALCIUM 9.3 10/12/2018    LABGLOM 74 10/12/2018    GLUCOSE 97 10/12/2018       Lab Results   Component Value Date    ALKPHOS 62 10/12/2018    ALT 17 10/12/2018    AST 21 10/12/2018    PROT 7.5 10/12/2018    BILITOT 0.6 10/12/2018    BILIDIR <0.2 10/12/2018    LABALBU 4.6 10/12/2018       Lab Results   Component Value Date    MG 2.0 10/12/2018       Lab Results   Component Value Date    INR 0.98 10/12/2018         No results found for: LABA1C    No results found for: TRIG, HDL, LDLCALC, LDLDIRECT, LABVLDL    No results found for: TSH      Testing Reviewed:      I have individually reviewed the cardiac test below:    ECHO:   Results for orders placed during the hospital encounter of 10/12/18   ECHO Complete 2D W Doppler W Color    Narrative Transthoracic Echocardiography Report (TTE)     Demographics      Patient Name    Georgina Burciaga  Gender               Male                   J      MR #            [id number]       Race                                                       Ethnicity      Account #       [de-identified]       Room Number          0003      Accession       [id number]       Date of Study        10/12/2018   Number      Date of Birth   1950      Referring Physician  Reginald Kiran MD      Age             80 year(s)      Sonographer Wendy Mcgrath [facility]                                      Interpreting         Rickey Guillen MD                                   Physician     Procedure    Type of Study      TTE procedure:ECHOCARDIOGRAM COMPLETE 2D W DOPPLER W COLOR. Procedure Date  Date: 10/12/2018 Start: 01:24 PM    Study Location: Bedside  Technical Quality: Adequate visualization    Indications:STEMI. Additional Medical History:Elevated troponin, family history of CAD, tobacco  use    Patient Status: Routine    Height: 67 inches Weight: 218.26 pounds BSA: 2.1 m^2 BMI: 34.18 kg/m^2    BP: 149/94 mmHg    Allergies    - No Known Allergies. Conclusions      Summary   Normal left ventricle size and moderately reduced systolic function. Ejection fraction was estimated at 40-45%. There was severe hypokinesis of the inferior, lateral, and inferolateral   walls. E/A flow reversal noted. Suggestive of diastolic dysfunction. There was mild mitral regurgitation. Ascending aorta = 3.93 cm. IVC size is within normal limits with normal respiratory phasic changes. Signature      ----------------------------------------------------------------   Electronically signed by Rickey Guillen MD (Interpreting   physician) on 10/12/2018 at 03:22 PM   ----------------------------------------------------------------      Findings      Mitral Valve   The mitral valve structure was normal with normal leaflet separation. DOPPLER: The transmitral velocity was within the normal range with no   evidence for mitral stenosis. There was mild mitral regurgitation. Aortic Valve   The aortic valve was trileaflet with normal thickness and cuspal   separation. DOPPLER: Transaortic velocity was within the normal range with   no evidence of aortic stenosis. There was no evidence of aortic   regurgitation. Tricuspid Valve   The tricuspid valve structure was normal with normal leaflet separation.    DOPPLER: There was no evidence of tricuspid 93.6 cm/s cm/s                  cm/s   MV E/A Ratio: 0.67        AV Peak Gradient:     LVOT Peak Gradient: 2 mmHg   MV Peak Gradient: 1.59    4.49 mmHg   mmHg                                            TV Peak E-Wave: 38.3 cm/s                                                   TV Peak A-Wave: 47.5 cm/s   MV Deceleration Time: 268   msec                                            TV Peak Gradient: 0.59                                                   mmHg                                                   TR Velocity:225 cm/s   MV E' Septal Velocity:                          TR Gradient:20.25 mmHg   5.2 cm/s                  AV DVI (Vmax):0.72    PV Peak Velocity: 65 cm/s   MV A' Septal Velocity:                          PV Peak Gradient: 1.69   11.5 cm/s                                       mmHg   MV E' Lateral Velocity:   5.3 cm/s   MV A' Lateral Velocity:   7.5 cm/s   E/E' septal: 12.13   E/E' lateral: 11.91   MR Velocity: 343 cm/s     [email or web address]? [id number]%[id number]        Assessment/Plan   S/p STEMI with PCI of the LCX to PDA, 10/2018  ICM, EF 40-45%, NYHA II  Cough after medications  Mild FLOWER  D/c Lisinopril, Rx Losartan, the patient was advised on risk/benefits of the new Rx and he agreed to proceed with the medication(s). Cannot stop DAPT for any elective procedures due to the recent stent but may proceed with dental extraction if they will do it on DAPT, otherwise will need to wait. Add Lasix 20 mg PO q day with KCL. The patient was advised on risk/benefits of the new Rx and he agreed to proceed with the medication(s). Monitor BMP. Discussed diet/exercise/BP/weight loss/health lifestyle choices/lipids; the patient understands the goals and will try to comply.       Disposition:  6 months         Electronically signed by Jimmy Arreaga MD   4/1/2019 at 10:10 AM

## 2019-04-11 ENCOUNTER — HOSPITAL ENCOUNTER (OUTPATIENT)
Age: 69
Discharge: HOME | End: 2019-04-11
Payer: MEDICARE

## 2019-04-11 DIAGNOSIS — R06.02 SOB (SHORTNESS OF BREATH): ICD-10-CM

## 2019-04-11 LAB
ANION GAP SERPL CALCULATED.3IONS-SCNC: 14 MEQ/L (ref 8–16)
BUN BLDV-MCNC: 25 MG/DL (ref 7–22)
CALCIUM SERPL-MCNC: 9.4 MG/DL (ref 8.5–10.5)
CHLORIDE BLD-SCNC: 99 MEQ/L (ref 98–111)
CO2: 27 MEQ/L (ref 23–33)
CREAT SERPL-MCNC: 1.4 MG/DL (ref 0.4–1.2)
GFR SERPL CREATININE-BSD FRML MDRD: 50 ML/MIN/1.73M2
GLUCOSE BLD-MCNC: 86 MG/DL (ref 70–108)
POTASSIUM SERPL-SCNC: 4.9 MEQ/L (ref 3.5–5.2)
SODIUM BLD-SCNC: 140 MEQ/L (ref 135–145)

## 2019-04-11 PROCEDURE — 80048 BASIC METABOLIC PNL TOTAL CA: CPT

## 2019-04-11 PROCEDURE — 36415 COLL VENOUS BLD VENIPUNCTURE: CPT

## 2019-05-15 RX ORDER — ATORVASTATIN CALCIUM 80 MG/1
80 TABLET, FILM COATED ORAL NIGHTLY
Qty: 30 TABLET | Refills: 5 | Status: SHIPPED | OUTPATIENT
Start: 2019-05-15 | End: 2019-11-27 | Stop reason: SDUPTHER

## 2019-05-15 NOTE — TELEPHONE ENCOUNTER
5/15/19   Faheem Soares called requesting a refill on the following medications:  Requested Prescriptions     Pending Prescriptions Disp Refills    metoprolol tartrate (LOPRESSOR) 25 MG tablet 60 tablet 2     Sig: Take 1 tablet by mouth 2 times daily    ticagrelor (BRILINTA) 90 MG TABS tablet 60 tablet 2     Sig: Take 1 tablet by mouth 2 times daily    atorvastatin (LIPITOR) 80 MG tablet 30 tablet 2     Sig: Take 1 tablet by mouth nightly     Pharmacy verified: [facility]      Date of last visit:  4/1/19  Date of next visit (if applicable): RECALL  blm

## 2019-07-31 RX ORDER — FUROSEMIDE 20 MG/1
20 TABLET ORAL 2 TIMES DAILY
Qty: 60 TABLET | Refills: 3 | Status: SHIPPED | OUTPATIENT
Start: 2019-07-31 | End: 2019-11-27 | Stop reason: SDUPTHER

## 2019-09-24 RX ORDER — POTASSIUM CHLORIDE 20 MEQ/1
20 TABLET, EXTENDED RELEASE ORAL DAILY
Qty: 90 TABLET | Refills: 1 | Status: SHIPPED | OUTPATIENT
Start: 2019-09-24 | End: 2020-03-24 | Stop reason: SDUPTHER

## 2019-09-24 NOTE — TELEPHONE ENCOUNTER
09/24/2019   Monique Gibbon called requesting a refill on the following medications:  Requested Prescriptions     Pending Prescriptions Disp Refills    potassium chloride (KLOR-CON M) 20 MEQ extended release tablet 90 tablet 1     Sig: Take 1 tablet by mouth daily     Pharmacy verified:  [facility]      Date of last visit: 04/01/2019  Date of next visit (if applicable): Visit date not found

## 2019-10-11 RX ORDER — LOSARTAN POTASSIUM 25 MG/1
25 TABLET ORAL DAILY
Qty: 90 TABLET | Refills: 1 | Status: SHIPPED | OUTPATIENT
Start: 2019-10-11 | End: 2020-05-07 | Stop reason: SDUPTHER

## 2019-10-30 ENCOUNTER — OFFICE VISIT (OUTPATIENT)
Dept: CARDIOLOGY CLINIC | Age: 69
End: 2019-10-30
Payer: MEDICARE

## 2019-10-30 VITALS
WEIGHT: 231.2 LBS | HEIGHT: 67 IN | DIASTOLIC BLOOD PRESSURE: 84 MMHG | SYSTOLIC BLOOD PRESSURE: 136 MMHG | BODY MASS INDEX: 36.29 KG/M2 | HEART RATE: 65 BPM

## 2019-10-30 DIAGNOSIS — I25.5 ISCHEMIC CARDIOMYOPATHY: Primary | ICD-10-CM

## 2019-10-30 DIAGNOSIS — I10 ESSENTIAL HYPERTENSION: ICD-10-CM

## 2019-10-30 PROCEDURE — G8484 FLU IMMUNIZE NO ADMIN: HCPCS | Performed by: INTERNAL MEDICINE

## 2019-10-30 PROCEDURE — 1123F ACP DISCUSS/DSCN MKR DOCD: CPT | Performed by: INTERNAL MEDICINE

## 2019-10-30 PROCEDURE — G8417 CALC BMI ABV UP PARAM F/U: HCPCS | Performed by: INTERNAL MEDICINE

## 2019-10-30 PROCEDURE — 4040F PNEUMOC VAC/ADMIN/RCVD: CPT | Performed by: INTERNAL MEDICINE

## 2019-10-30 PROCEDURE — 3017F COLORECTAL CA SCREEN DOC REV: CPT | Performed by: INTERNAL MEDICINE

## 2019-10-30 PROCEDURE — 99213 OFFICE O/P EST LOW 20 MIN: CPT | Performed by: INTERNAL MEDICINE

## 2019-10-30 PROCEDURE — G8599 NO ASA/ANTIPLAT THER USE RNG: HCPCS | Performed by: INTERNAL MEDICINE

## 2019-10-30 PROCEDURE — G8427 DOCREV CUR MEDS BY ELIG CLIN: HCPCS | Performed by: INTERNAL MEDICINE

## 2019-10-30 PROCEDURE — 4004F PT TOBACCO SCREEN RCVD TLK: CPT | Performed by: INTERNAL MEDICINE

## 2019-10-30 RX ORDER — METOPROLOL TARTRATE 50 MG/1
50 TABLET, FILM COATED ORAL 2 TIMES DAILY
Qty: 60 TABLET | Refills: 3 | Status: SHIPPED | OUTPATIENT
Start: 2019-10-30 | End: 2020-03-24 | Stop reason: SDUPTHER

## 2019-10-30 RX ORDER — NITROGLYCERIN 0.4 MG/1
TABLET SUBLINGUAL
Qty: 25 TABLET | Refills: 1 | Status: SHIPPED | OUTPATIENT
Start: 2019-10-30 | End: 2020-11-09 | Stop reason: SDUPTHER

## 2019-11-27 RX ORDER — ATORVASTATIN CALCIUM 80 MG/1
80 TABLET, FILM COATED ORAL NIGHTLY
Qty: 90 TABLET | Refills: 4 | Status: SHIPPED | OUTPATIENT
Start: 2019-11-27 | End: 2020-11-09 | Stop reason: SDUPTHER

## 2019-11-27 RX ORDER — FUROSEMIDE 20 MG/1
20 TABLET ORAL 2 TIMES DAILY
Qty: 180 TABLET | Refills: 4 | Status: SHIPPED | OUTPATIENT
Start: 2019-11-27 | End: 2020-11-09 | Stop reason: SDUPTHER

## 2020-01-07 NOTE — CARE COORDINATION
will be receiving a Medicare beneficiary letter in the mail.   The letter will be mailed to the pt      Follow Up  Future Appointments  Date Time Provider Robert Kaplan   10/30/2018 9:00 AM OLIVIER Long - CNP [address] [facility] [address] [facility] Transition Coordinator  [phone] Other encephalopathy

## 2020-03-24 NOTE — TELEPHONE ENCOUNTER
Domo Chow called requesting a refill on the following medications:  Requested Prescriptions     Pending Prescriptions Disp Refills    potassium chloride (KLOR-CON M) 20 MEQ extended release tablet 90 tablet 1     Sig: Take 1 tablet by mouth daily    metoprolol tartrate (LOPRESSOR) 50 MG tablet 60 tablet 3     Sig: Take 1 tablet by mouth 2 times daily     Pharmacy verified: [facility] on [address]      Date of last visit: 10/30/2019  Date of next visit (if applicable): 53/4/0037

## 2020-03-25 RX ORDER — POTASSIUM CHLORIDE 20 MEQ/1
20 TABLET, EXTENDED RELEASE ORAL DAILY
Qty: 90 TABLET | Refills: 2 | Status: SHIPPED | OUTPATIENT
Start: 2020-03-25 | End: 2020-11-09 | Stop reason: SDUPTHER

## 2020-03-25 RX ORDER — METOPROLOL TARTRATE 50 MG/1
50 TABLET, FILM COATED ORAL 2 TIMES DAILY
Qty: 180 TABLET | Refills: 2 | Status: SHIPPED | OUTPATIENT
Start: 2020-03-25 | End: 2020-11-09 | Stop reason: SDUPTHER

## 2020-05-07 RX ORDER — LOSARTAN POTASSIUM 25 MG/1
25 TABLET ORAL DAILY
Qty: 90 TABLET | Refills: 2 | Status: SHIPPED | OUTPATIENT
Start: 2020-05-07 | End: 2021-03-03 | Stop reason: SDUPTHER

## 2020-11-09 ENCOUNTER — OFFICE VISIT (OUTPATIENT)
Dept: CARDIOLOGY CLINIC | Age: 70
End: 2020-11-09
Payer: MEDICARE

## 2020-11-09 VITALS
DIASTOLIC BLOOD PRESSURE: 80 MMHG | HEIGHT: 68 IN | SYSTOLIC BLOOD PRESSURE: 132 MMHG | BODY MASS INDEX: 37.56 KG/M2 | WEIGHT: 247.8 LBS | HEART RATE: 71 BPM

## 2020-11-09 PROCEDURE — G8417 CALC BMI ABV UP PARAM F/U: HCPCS | Performed by: INTERNAL MEDICINE

## 2020-11-09 PROCEDURE — 4040F PNEUMOC VAC/ADMIN/RCVD: CPT | Performed by: INTERNAL MEDICINE

## 2020-11-09 PROCEDURE — G8484 FLU IMMUNIZE NO ADMIN: HCPCS | Performed by: INTERNAL MEDICINE

## 2020-11-09 PROCEDURE — 93000 ELECTROCARDIOGRAM COMPLETE: CPT | Performed by: INTERNAL MEDICINE

## 2020-11-09 PROCEDURE — 99213 OFFICE O/P EST LOW 20 MIN: CPT | Performed by: INTERNAL MEDICINE

## 2020-11-09 PROCEDURE — 4004F PT TOBACCO SCREEN RCVD TLK: CPT | Performed by: INTERNAL MEDICINE

## 2020-11-09 PROCEDURE — G8427 DOCREV CUR MEDS BY ELIG CLIN: HCPCS | Performed by: INTERNAL MEDICINE

## 2020-11-09 PROCEDURE — 1123F ACP DISCUSS/DSCN MKR DOCD: CPT | Performed by: INTERNAL MEDICINE

## 2020-11-09 PROCEDURE — 3017F COLORECTAL CA SCREEN DOC REV: CPT | Performed by: INTERNAL MEDICINE

## 2020-11-09 RX ORDER — POTASSIUM CHLORIDE 20 MEQ/1
20 TABLET, EXTENDED RELEASE ORAL DAILY
Qty: 90 TABLET | Refills: 2 | Status: SHIPPED | OUTPATIENT
Start: 2020-11-09 | End: 2021-09-15 | Stop reason: SDUPTHER

## 2020-11-09 RX ORDER — METOPROLOL TARTRATE 50 MG/1
50 TABLET, FILM COATED ORAL 2 TIMES DAILY
Qty: 180 TABLET | Refills: 2 | Status: SHIPPED | OUTPATIENT
Start: 2020-11-09 | End: 2021-09-29 | Stop reason: SDUPTHER

## 2020-11-09 RX ORDER — ATORVASTATIN CALCIUM 80 MG/1
80 TABLET, FILM COATED ORAL NIGHTLY
Qty: 90 TABLET | Refills: 3 | Status: SHIPPED | OUTPATIENT
Start: 2020-11-09 | End: 2022-03-22 | Stop reason: SDUPTHER

## 2020-11-09 RX ORDER — NITROGLYCERIN 0.4 MG/1
TABLET SUBLINGUAL
Qty: 25 TABLET | Refills: 3 | Status: SHIPPED | OUTPATIENT
Start: 2020-11-09

## 2020-11-09 RX ORDER — FUROSEMIDE 20 MG/1
20 TABLET ORAL 2 TIMES DAILY
Qty: 180 TABLET | Refills: 3 | Status: SHIPPED | OUTPATIENT
Start: 2020-11-09 | End: 2022-01-20 | Stop reason: SDUPTHER

## 2020-11-09 NOTE — PROGRESS NOTES
**This is a Medical/ PA/ APRN Student Note and is charted for educational purposes. The non-physician staff attested note is not to be used for billing purposes or to guide patient care. Please see the physician modifications/ attestation for treatment plan/suggestions. This note has been reviewed and feedback has been provided to the student. *      [address] [place] [address] [address] [place] [address]  Dept: [phone]  Dept Fax: [phone]  Loc: [phone]    Visit Date: 11/9/2020    Mr. Dennie Bucker is a 72 y.o. male  who presented for: 1 year follow up      HPI:   Mr. Dennie Bucker is a 72year old male presenting for 1 year follow up. He has no complaints at this time. PMH includes ischemic cardiomyopathy, HLD, HTN. He is s/p PCI of LCX to PDA (10/2018). He denies chest pain, palpitations, headache, syncope. He admits to occasional numbness in his left foot after prolonged walking. He denies any LE pain or tingling. He drinks a couple beers a week and uses chewing tobacco. He used to smoke 2 packs per day and quit smoking cigarettes approximately 15 years ago. Current Outpatient Medications:     losartan (COZAAR) 25 MG tablet, Take 1 tablet by mouth daily, Disp: 90 tablet, Rfl: 2    potassium chloride (KLOR-CON M) 20 MEQ extended release tablet, Take 1 tablet by mouth daily, Disp: 90 tablet, Rfl: 2    metoprolol tartrate (LOPRESSOR) 50 MG tablet, Take 1 tablet by mouth 2 times daily, Disp: 180 tablet, Rfl: 2    atorvastatin (LIPITOR) 80 MG tablet, Take 1 tablet by mouth nightly, Disp: 90 tablet, Rfl: 4    furosemide (LASIX) 20 MG tablet, Take 1 tablet by mouth 2 times daily, Disp: 180 tablet, Rfl: 4    ticagrelor (BRILINTA) 90 MG TABS tablet, Take 1 tablet by mouth 2 times daily, Disp: 180 tablet, Rfl: 4    nitroGLYCERIN (NITROSTAT) 0.4 MG SL tablet, up to max of 3 total doses.  If no relief after 1 dose, call 911., Disp: 25 tablet, Rfl: 1    aspirin 81 MG chewable tablet, Take 1 tablet by mouth daily, Disp: 30 tablet, Rfl: 0    Past Medical History  Christopher Dixon  has a past medical history of Arthritis. Social History  Christopher Dixon  reports that he quit smoking about 42 years ago. His smokeless tobacco use includes chew. He reports that he does not drink alcohol or use drugs. Family History  Christopher Dixon family history includes Cancer in his brother; Coronary Art Dis in his brother and father. There is no family history of bicuspid aortic valve, aneurysms, heart transplant, pacemakers, defibrillators, or sudden cardiac death. Past Surgical History   Past Surgical History:   Procedure Laterality Date    APPENDECTOMY         Review of Systems   Constitutional: Negative for chills and fever  HENT: Negative for congestion, sinus pressure, sneezing and sore throat. Eyes: Negative for pain, discharge, redness and itching. Respiratory: Negative for apnea, cough  Gastrointestinal: Negative for blood in stool, constipation, diarrhea   Endocrine: Negative for cold intolerance, heat intolerance, polydipsia. Genitourinary: Negative for dysuria, enuresis, flank pain and hematuria. Musculoskeletal: Negative for arthralgias, joint swelling and neck pain. Neurological: Negative for numbness and headaches. Psychiatric/Behavioral: Negative for agitation, confusion, decreased concentration and dysphoric mood. Objective:     /80   Pulse 71   Ht 5' 7.5\" (1.715 m)   Wt 247 lb 12.8 oz (112.4 kg)   BMI 38.24 kg/m²     Wt Readings from Last 3 Encounters:   11/09/20 247 lb 12.8 oz (112.4 kg)   10/30/19 231 lb 3.2 oz (104.9 kg)   04/01/19 222 lb (100.7 kg)     BP Readings from Last 3 Encounters:   11/09/20 132/80   10/30/19 136/84   04/01/19 (!) 138/94       Nursing note and vitals reviewed. Physical Exam   Constitutional: Oriented to person, place, and time. Appears well-developed and well-nourished. HENT:   Head: Normocephalic and atraumatic. Eyes: EOM are normal. Pupils are equal, round, and reactive to light. Neck: Normal range of motion. Neck supple. No JVD present. Cardiovascular: Normal rate, regular rhythm, normal heart sounds and intact distal pulses. No murmur heard. Pulmonary/Chest: Effort normal and breath sounds normal. No respiratory distress. No wheezes. No rales. Abdominal: Soft. Bowel sounds are normal. No distension. There is no tenderness. Musculoskeletal: Normal range of motion. No edema. Neurological: Alert and oriented to person, place, and time. No cranial nerve deficit. Coordination normal.   Skin: Skin is warm and dry. Psychiatric: Normal mood and affect.        No results found for: CKTOTAL, CKMB, CKMBINDEX    Lab Results   Component Value Date    WBC 9.3 10/12/2018    RBC 5.92 10/12/2018    HGB 16.0 10/12/2018    HCT 49.2 10/12/2018    MCV 83.1 10/12/2018    MCH 27.0 10/12/2018    MCHC 32.5 10/12/2018     10/12/2018    MPV 9.7 10/12/2018       Lab Results   Component Value Date     04/11/2019    K 4.9 04/11/2019    CL 99 04/11/2019    CO2 27 04/11/2019    BUN 25 04/11/2019    LABALBU 4.6 10/12/2018    CREATININE 1.4 04/11/2019    CALCIUM 9.4 04/11/2019    LABGLOM 50 04/11/2019    GLUCOSE 86 04/11/2019       Lab Results   Component Value Date    ALKPHOS 62 10/12/2018    ALT 17 10/12/2018    AST 21 10/12/2018    PROT 7.5 10/12/2018    BILITOT 0.6 10/12/2018    BILIDIR <0.2 10/12/2018    LABALBU 4.6 10/12/2018       Lab Results   Component Value Date    MG 2.0 10/12/2018       Lab Results   Component Value Date    INR 0.98 10/12/2018         No results found for: LABA1C    No results found for: TRIG, HDL, LDLCALC, LDLDIRECT, LABVLDL    No results found for: TSH      Testing Reviewed:      I have individually reviewed the cardiac test below:    ECHO:   Results for orders placed during the hospital encounter of 10/12/18   ECHO Complete 2D W Doppler W Color    Narrative Transthoracic Echocardiography Report (TTE)     Demographics      Patient Name    Bernabe Lugo  Gender               Pb LOPES      MR #            [id number]       Race                                                       Ethnicity      Account #       [de-identified]       Room Number          0003      Accession       [id number]       Date of Study        10/12/2018   Number      Date of Birth   1950      Referring Physician  Karina Sahu MD      Age             80 year(s)      Deborah Hooks MD                                   Physician     Procedure    Type of Study      TTE procedure:ECHOCARDIOGRAM COMPLETE 2D W DOPPLER W COLOR. Procedure Date  Date: 10/12/2018 Start: 01:24 PM    Study Location: Bedside  Technical Quality: Adequate visualization    Indications:STEMI. Additional Medical History:Elevated troponin, family history of CAD, tobacco  use    Patient Status: Routine    Height: 67 inches Weight: 218.26 pounds BSA: 2.1 m^2 BMI: 34.18 kg/m^2    BP: 149/94 mmHg    Allergies    - No Known Allergies. Conclusions      Summary   Normal left ventricle size and moderately reduced systolic function. Ejection fraction was estimated at 40-45%. There was severe hypokinesis of the inferior, lateral, and inferolateral   walls. E/A flow reversal noted. Suggestive of diastolic dysfunction. There was mild mitral regurgitation. Ascending aorta = 3.93 cm. IVC size is within normal limits with normal respiratory phasic changes. Signature      ----------------------------------------------------------------   Electronically signed by Karina Sahu MD (Interpreting   physician) on 10/12/2018 at 03:22 PM   ----------------------------------------------------------------      Findings      Mitral Valve   The mitral valve structure was normal with normal leaflet separation.    DOPPLER: The transmitral velocity was within the normal range with no   evidence for mitral stenosis. There was mild mitral regurgitation. Aortic Valve   The aortic valve was trileaflet with normal thickness and cuspal   separation. DOPPLER: Transaortic velocity was within the normal range with   no evidence of aortic stenosis. There was no evidence of aortic   regurgitation. Tricuspid Valve   The tricuspid valve structure was normal with normal leaflet separation. DOPPLER: There was no evidence of tricuspid stenosis. There was no   evidence of tricuspid regurgitation. Pulmonic Valve   The pulmonic valve leaflets were not well seen. DOPPLER: The transpulmonic   velocity was within the normal range with no evidence for regurgitation. Left Atrium   Left atrial size was normal.      Left Ventricle   Normal left ventricle size and moderately reduced systolic function. Ejection fraction was estimated at 40-45%. There was severe hypokinesis of   the inferior, lateral, and inferolateral walls. The wall thickness was   within normal limits. E/A flow reversal noted. Suggestive of diastolic dysfunction. Right Atrium   Right atrial size was normal.      Right Ventricle   The right ventricular size was normal with normal systolic function and   wall thickness. Pericardial Effusion   The pericardium was normal in appearance with no evidence of a pericardial   effusion. Pleural Effusion   No evidence of pleural effusion. Aorta / Great Vessels   -Ascending aorta = 3.93 cm. -IVC size is within normal limits with normal respiratory phasic changes.      M-Mode/2D Measurements & Calculations      LV Diastolic   LV Systolic Dimension:    AV Cusp Separation: 1.5 cmLA   Dimension: 4.5 3.6 cm                    Dimension: 3.8 cmAO Root   cm             LV Volume Diastolic: 56.0 Dimension: 3.7 cmLA Area: 14.7   LV FS:20 %     ml                        cm^2   LV PW          LV Volume Systolic: 80.2   Diastolic: 0.9 ml   cm             LV EDV/LV EDV Index: 92.4   Septum         ml/44 m^2LV ESV/LV ESV    RV Diastolic Dimension: 2.8 cm   Diastolic: 0.8 Index: 86.8 ml/26 m^2   cm             EF Calculated: 41.1 %     LA/Aorta: 1.03                     LV Length: 7.9 cm         LA volume/Index: 37.8 ml /18m^2      LV Area   Diastolic: 25   cm^2   LV Area   Systolic: 65.4   cm^2     Doppler Measurements & Calculations      MV Peak E-Wave: 63.1 cm/s AV Peak Velocity: 106 LVOT Peak Velocity: 76.6   MV Peak A-Wave: 93.6 cm/s cm/s                  cm/s   MV E/A Ratio: 0.67        AV Peak Gradient:     LVOT Peak Gradient: 2 mmHg   MV Peak Gradient: 1.59    4.49 mmHg   mmHg                                            TV Peak E-Wave: 38.3 cm/s                                                   TV Peak A-Wave: 47.5 cm/s   MV Deceleration Time: 268   msec                                            TV Peak Gradient: 0.59                                                   mmHg                                                   TR Velocity:225 cm/s   MV E' Septal Velocity:                          TR Gradient:20.25 mmHg   5.2 cm/s                  AV DVI (Vmax):0.72    PV Peak Velocity: 65 cm/s   MV A' Septal Velocity:                          PV Peak Gradient: 1.69   11.5 cm/s                                       mmHg   MV E' Lateral Velocity:   5.3 cm/s   MV A' Lateral Velocity:   7.5 cm/s   E/E' septal: 12.13   E/E' lateral: 11.91   MR Velocity: 343 cm/s     [email or web address] [id number]        Assessment/Plan   1. Ischemic cardiomyopathy: s/p PCI of LCX to PDA (10/2018). Patient has no symptoms at this time. Continue DAPT. Will need ECHO to determine EF because he never got one after PCI in 2018. Last EF was 40-45%. 2. Essential Hypertension: well controlled with Cozaar, Lopressor, Lasix. BP today 132/80  3.  Hyperlipidemia: will need

## 2020-11-16 ENCOUNTER — HOSPITAL ENCOUNTER (EMERGENCY)
Age: 70
Discharge: HOME | End: 2020-11-16
Payer: MEDICARE

## 2020-11-16 VITALS
SYSTOLIC BLOOD PRESSURE: 183 MMHG | DIASTOLIC BLOOD PRESSURE: 106 MMHG | HEART RATE: 63 BPM | RESPIRATION RATE: 18 BRPM | OXYGEN SATURATION: 95 % | TEMPERATURE: 97.1 F

## 2020-11-16 PROCEDURE — 99213 OFFICE O/P EST LOW 20 MIN: CPT

## 2020-11-16 PROCEDURE — U0003 INFECTIOUS AGENT DETECTION BY NUCLEIC ACID (DNA OR RNA); SEVERE ACUTE RESPIRATORY SYNDROME CORONAVIRUS 2 (SARS-COV-2) (CORONAVIRUS DISEASE [COVID-19]), AMPLIFIED PROBE TECHNIQUE, MAKING USE OF HIGH THROUGHPUT TECHNOLOGIES AS DESCRIBED BY CMS-2020-01-R: HCPCS

## 2020-11-16 PROCEDURE — 99213 OFFICE O/P EST LOW 20 MIN: CPT | Performed by: NURSE PRACTITIONER

## 2020-11-16 RX ORDER — CALCIUM CARBONATE 260MG(650)
TABLET,CHEWABLE ORAL
Refills: 0 | COMMUNITY
Start: 2020-11-16 | End: 2022-01-26

## 2020-11-16 RX ORDER — ASCORBIC ACID/MULTIVIT-MIN 1000 MG
EFFERVESCENT POWDER IN PACKET ORAL
Refills: 0 | COMMUNITY
Start: 2020-11-16 | End: 2022-01-26

## 2020-11-16 ASSESSMENT — ENCOUNTER SYMPTOMS
RHINORRHEA: 1
CHOKING: 0
COUGH: 1
CHEST TIGHTNESS: 0
SHORTNESS OF BREATH: 0
SINUS PAIN: 1
APNEA: 0
SWOLLEN GLANDS: 0
SORE THROAT: 1
STRIDOR: 0

## 2020-11-16 NOTE — ED PROVIDER NOTES
[place] [id number]  Urgent Care Encounter      CHIEF COMPLAINT       Chief Complaint   Patient presents with    Covid Testing       Nurses Notes reviewed and I agree except as noted in the HPI. HISTORY OFPRESENT ILLNESS   Alysa Ramirez is a 72 y.o. The history is provided by the patient. No  was used. URI   Presenting symptoms: congestion, cough, rhinorrhea and sore throat    Presenting symptoms: no ear pain, no facial pain, no fatigue and no fever    Severity:  Moderate  Onset quality:  Gradual  Duration:  5 days  Timing:  Constant  Progression:  Waxing and waning  Chronicity:  New  Relieved by:  Nothing  Worsened by:  Certain positions  Ineffective treatments:  None tried  Associated symptoms: headaches, sinus pain and sneezing    Associated symptoms: no arthralgias, no myalgias, no neck pain and no swollen glands    Risk factors: sick contacts    Risk factors: not elderly, no chronic cardiac disease, no chronic kidney disease, no chronic respiratory disease, no diabetes mellitus, no immunosuppression, no recent illness and no recent travel        REVIEW OF SYSTEMS     Review of Systems   Constitutional: Negative for activity change, appetite change, chills, diaphoresis, fatigue and fever. HENT: Positive for congestion, postnasal drip, rhinorrhea, sinus pain, sneezing and sore throat. Negative for ear pain. Respiratory: Positive for cough. Negative for apnea, choking, chest tightness, shortness of breath and stridor. Cardiovascular: Negative for chest pain, palpitations and leg swelling. Musculoskeletal: Negative for arthralgias, myalgias and neck pain. Neurological: Positive for headaches. Negative for dizziness and light-headedness. PAST MEDICAL HISTORY         Diagnosis Date    Arthritis     Hypertension        SURGICAL HISTORY     Patient  has a past surgical history that includes Appendectomy.     CURRENT MEDICATIONS       Discharge Medication Mouth/Throat:      Mouth: Mucous membranes are moist.      Pharynx: Uvula midline. Posterior oropharyngeal erythema and uvula swelling present. No pharyngeal swelling or oropharyngeal exudate. Tonsils: No tonsillar exudate or tonsillar abscesses. Eyes:      Extraocular Movements: Extraocular movements intact. Conjunctiva/sclera: Conjunctivae normal.   Neck:      Musculoskeletal: Normal range of motion. Pulmonary:      Effort: Pulmonary effort is normal. No respiratory distress. Breath sounds: Normal breath sounds. No stridor. No wheezing, rhonchi or rales. Chest:      Chest wall: No tenderness. Musculoskeletal: Normal range of motion. Skin:     General: Skin is warm. Neurological:      General: No focal deficit present. Mental Status: He is alert and oriented to person, place, and time. Psychiatric:         Behavior: Behavior is cooperative. DIAGNOSTIC RESULTS   Labs:No results found for this visit on 11/16/20. IMAGING:  No orders to display     URGENT CARE COURSE:     Vitals:    11/16/20 1507   BP: (!) 183/106   Pulse: 63   Resp: 18   Temp: 97.1 °F (36.2 °C)   TempSrc: Tympanic   SpO2: 95%       Medications - No data to display  PROCEDURES:  None  FINAL IMPRESSION      1. Acute upper respiratory infection    2. Patient request for diagnostic testing        DISPOSITION/PLAN   Decision To Discharge    Drink lots of fluids  Take Motrin or Tylenol for headache  Humidification of air  Use nasal spray as directed  Take a nasal decongestant as directed  Monitor for any fever increased and sinus pain or pressure  Follow-up see her primary care provider in 48 hours  Or go to the emergency department for any changes or concerns. Patient is to self isolate until COVID-19 test result is known. If applicable, work/school note has been provided to be off until COVID-19 test results. Follow with family provider for further work/school note needs.   May take over-the-counter vitamin D, vitamin C, and zinc as directed on bottles if no contraindications. If you develop severe respiratory distress (trouble breathing) or have a medical emergency, go to your nearest emergency room or call 911.       PATIENT REFERRED TO:  [address] [place] [id number]  Call today  [phone]    [facility]  [phone]  Go today  If symptoms worsen    DISCHARGE MEDICATIONS:  Discharge Medication List as of 11/16/2020  3:32 PM      START taking these medications    Details   Multiple Vitamins-Minerals (EMERGEN-C VITAMIN C) PACK Follow package directions, R-0OTC      Zinc 10 MG LOZG Follow package directions, R-0OTC      Nasal Wash Surendra Peper) SOLN Follow package directions, R-0OTC           Discharge Medication List as of 11/16/2020  3:32 PM          OLIVIER Bal CNP, APRN - CNP  11/16/20 1543

## 2020-11-16 NOTE — ED NOTES
Pt verbalized discharge instructions. Pt informed to go to ER if develop chest pain, shortness of breath or abdominal pain. Pt ambulatory out in stable condition. Assessment unchanged.        Kain Ricks, RN  11/16/20 3882

## 2020-11-17 ENCOUNTER — CARE COORDINATION (OUTPATIENT)
Dept: CARE COORDINATION | Age: 70
End: 2020-11-17

## 2020-11-17 NOTE — CARE COORDINATION
Patient was called to follow up with most recent ER visit. There was no answer. A message was left on voicemail to have patient call back regarding ER visit. Office number given [phone].

## 2020-11-19 LAB — SARS-COV-2: NOT DETECTED

## 2020-12-22 RX ORDER — POTASSIUM CHLORIDE 20 MEQ/1
20 TABLET, EXTENDED RELEASE ORAL DAILY
Qty: 90 TABLET | Refills: 2 | OUTPATIENT
Start: 2020-12-22

## 2020-12-22 NOTE — TELEPHONE ENCOUNTER
Edmond Bergeron called requesting a refill on the following medications:  Requested Prescriptions     Pending Prescriptions Disp Refills    potassium chloride (KLOR-CON M) 20 MEQ extended release tablet 90 tablet 2     Sig: Take 1 tablet by mouth daily     Pharmacy verified:  .pv  [facility] [address]     Date of last visit: 11/18/2020  Date of next visit (if applicable): 41/06/3896

## 2021-03-03 RX ORDER — LOSARTAN POTASSIUM 25 MG/1
25 TABLET ORAL DAILY
Qty: 90 TABLET | Refills: 2 | Status: SHIPPED | OUTPATIENT
Start: 2021-03-03 | End: 2022-01-20 | Stop reason: SDUPTHER

## 2021-09-15 RX ORDER — POTASSIUM CHLORIDE 20 MEQ/1
20 TABLET, EXTENDED RELEASE ORAL DAILY
Qty: 90 TABLET | Refills: 1 | Status: SHIPPED | OUTPATIENT
Start: 2021-09-15 | End: 2022-03-11 | Stop reason: SDUPTHER

## 2021-09-29 RX ORDER — METOPROLOL TARTRATE 50 MG/1
50 TABLET, FILM COATED ORAL 2 TIMES DAILY
Qty: 180 TABLET | Refills: 2 | Status: SHIPPED | OUTPATIENT
Start: 2021-09-29 | End: 2022-01-20 | Stop reason: SDUPTHER

## 2022-01-20 NOTE — TELEPHONE ENCOUNTER
Karen Barone called requesting a refill on the following medications:  Requested Prescriptions     Pending Prescriptions Disp Refills    furosemide (LASIX) 20 MG tablet 180 tablet 3     Sig: Take 1 tablet by mouth 2 times daily    losartan (COZAAR) 25 MG tablet 90 tablet 2     Sig: Take 1 tablet by mouth daily    metoprolol tartrate (LOPRESSOR) 50 MG tablet 180 tablet 2     Sig: Take 1 tablet by mouth 2 times daily    ticagrelor (BRILINTA) 90 MG TABS tablet 180 tablet 3     Sig: Take 1 tablet by mouth 2 times daily     Pharmacy verified: [facility]   . pv      Date of last visit: 11/09/2020  Date of next visit (if applicable): Visit date not found

## 2022-01-21 RX ORDER — LOSARTAN POTASSIUM 25 MG/1
25 TABLET ORAL DAILY
Qty: 90 TABLET | Refills: 0 | Status: SHIPPED | OUTPATIENT
Start: 2022-01-21 | End: 2022-04-04

## 2022-01-21 RX ORDER — FUROSEMIDE 20 MG/1
20 TABLET ORAL 2 TIMES DAILY
Qty: 180 TABLET | Refills: 0 | Status: SHIPPED | OUTPATIENT
Start: 2022-01-21 | End: 2022-04-04

## 2022-01-21 RX ORDER — METOPROLOL TARTRATE 50 MG/1
50 TABLET, FILM COATED ORAL 2 TIMES DAILY
Qty: 180 TABLET | Refills: 0 | Status: SHIPPED | OUTPATIENT
Start: 2022-01-21 | End: 2022-04-04

## 2022-01-24 PROBLEM — I25.10 CORONARY ARTERY DISEASE INVOLVING NATIVE CORONARY ARTERY OF NATIVE HEART WITHOUT ANGINA PECTORIS: Status: ACTIVE | Noted: 2022-01-24

## 2022-01-24 PROBLEM — I25.5 ISCHEMIC CARDIOMYOPATHY: Status: ACTIVE | Noted: 2022-01-24

## 2022-01-24 NOTE — PROGRESS NOTES
[facility] PROFESSIONAL SERVICES  [facility] [address]    [address]   [address] [address]   Dept: [phone]   Dept Fax: [phone]: [phone]      No chief complaint on file. Cardiologist:  Dr. Elma Claire  79 yo male presents for 1 year f/u. Hx of ICDMP, HLD, HLD, PCI to x-PDA 2018. General:   No fever, no chills, no weight loss, no fatigue  Pulmonary:    No dyspnea, no wheezing  Cardiac:    Denies recent chest pain   GI:     No nausea or vomiting, no abdominal pain  Neuro:     No dizziness or light headedness  Musculoskeletal:  No recent active issues  Extremities:   No edema      Past Medical History:   Diagnosis Date    Arthritis     Hypertension        No Known Allergies    Current Outpatient Medications   Medication Sig Dispense Refill    furosemide (LASIX) 20 MG tablet Take 1 tablet by mouth 2 times daily 180 tablet 0    losartan (COZAAR) 25 MG tablet Take 1 tablet by mouth daily 90 tablet 0    metoprolol tartrate (LOPRESSOR) 50 MG tablet Take 1 tablet by mouth 2 times daily 180 tablet 0    ticagrelor (BRILINTA) 90 MG TABS tablet Take 1 tablet by mouth 2 times daily 180 tablet 0    potassium chloride (KLOR-CON M) 20 MEQ extended release tablet Take 1 tablet by mouth daily 90 tablet 1    Multiple Vitamins-Minerals (EMERGEN-C VITAMIN C) PACK Follow package directions  0    Zinc 10 MG LOZG Follow package directions  0    Nasal Wash (ALKALOL) SOLN Follow package directions  0    atorvastatin (LIPITOR) 80 MG tablet Take 1 tablet by mouth nightly 90 tablet 3    nitroGLYCERIN (NITROSTAT) 0.4 MG SL tablet up to max of 3 total doses. If no relief after 1 dose, call 911. 25 tablet 3    aspirin 81 MG chewable tablet Take 1 tablet by mouth daily 30 tablet 0     No current facility-administered medications for this visit.        Social History     Socioeconomic History    Marital status:      Spouse name: Not on file    Number of children: Not on file  Years of education: Not on file    Highest education level: Not on file   Occupational History    Occupation: drywall    Tobacco Use    Smoking status: Former Smoker     Quit date: 10/30/1978     Years since quittin.2    Smokeless tobacco: Current User     Types: Chew    Tobacco comment: quit 39 years ago   Vaping Use    Vaping Use: Never used   Substance and Sexual Activity    Alcohol use: No     Comment: occasional beer    Drug use: No    Sexual activity: Yes   Other Topics Concern    Not on file   Social History Narrative    Not on file     Social Determinants of Health     Financial Resource Strain:     Difficulty of Paying Living Expenses: Not on file   Food Insecurity:     Worried About Running Out of Food in the Last Year: Not on file    Razia of Food in the Last Year: Not on file   Transportation Needs:     Lack of Transportation (Medical): Not on file    Lack of Transportation (Non-Medical):  Not on file   Physical Activity:     Days of Exercise per Week: Not on file    Minutes of Exercise per Session: Not on file   Stress:     Feeling of Stress : Not on file   Social Connections:     Frequency of Communication with Friends and Family: Not on file    Frequency of Social Gatherings with Friends and Family: Not on file    Attends Protestant Services: Not on file    Active Member of [address] [place] [address] [email or web address] or Organizations: Not on file    Attends Club or Organization Meetings: Not on file    Marital Status: Not on file   Intimate Partner Violence:     Fear of Current or Ex-Partner: Not on file    Emotionally Abused: Not on file    Physically Abused: Not on file    Sexually Abused: Not on file   Housing Stability:     Unable to Pay for Housing in the Last Year: Not on file    Number of [place] in the Last Year: Not on file    Unstable Housing in the Last Year: Not on file       Family History   Problem Relation Age of Onset    Coronary Art Dis Father     Cancer Brother     Coronary Art Dis Brother        There were no vitals taken for this visit. General:   Well developed, well nourished  Lungs:   Clear to auscultation  Heart:    Normal S1 S2, No murmur, rubs, or gallops  Abdomen:   Soft, non tender, no organomegalies, positive bowel sounds  Extremities:   No edema, no cyanosis, good peripheral pulses  Neurological:   Awake, alert, oriented. No obvious focal deficits  Musculoskeletal:  No obvious deformities    EKG:          Diagnosis Orders   1. Essential hypertension     2. Ischemic cardiomyopathy     3. Coronary artery disease involving native coronary artery of native heart without angina pectoris     4. Dyslipidemia         No orders of the defined types were placed in this encounter.       Assessment/Plan:     Disposition:     Continue Dr Noris Gaines current treatment plan  Follow up with Dr Christa Garces as scheduled or sooner if needed

## 2022-01-26 ENCOUNTER — OFFICE VISIT (OUTPATIENT)
Dept: CARDIOLOGY CLINIC | Age: 72
End: 2022-01-26
Payer: COMMERCIAL

## 2022-01-26 VITALS
HEART RATE: 78 BPM | WEIGHT: 254 LBS | BODY MASS INDEX: 38.49 KG/M2 | HEIGHT: 68 IN | SYSTOLIC BLOOD PRESSURE: 142 MMHG | DIASTOLIC BLOOD PRESSURE: 84 MMHG

## 2022-01-26 DIAGNOSIS — I10 ESSENTIAL HYPERTENSION: ICD-10-CM

## 2022-01-26 DIAGNOSIS — Z98.61 CAD S/P PERCUTANEOUS CORONARY ANGIOPLASTY: Primary | ICD-10-CM

## 2022-01-26 DIAGNOSIS — I25.5 ISCHEMIC CARDIOMYOPATHY: ICD-10-CM

## 2022-01-26 DIAGNOSIS — I25.10 CAD S/P PERCUTANEOUS CORONARY ANGIOPLASTY: Primary | ICD-10-CM

## 2022-01-26 DIAGNOSIS — I25.10 CORONARY ARTERY DISEASE INVOLVING NATIVE CORONARY ARTERY OF NATIVE HEART WITHOUT ANGINA PECTORIS: ICD-10-CM

## 2022-01-26 DIAGNOSIS — E78.5 DYSLIPIDEMIA: ICD-10-CM

## 2022-01-26 PROCEDURE — 99213 OFFICE O/P EST LOW 20 MIN: CPT | Performed by: NURSE PRACTITIONER

## 2022-01-26 PROCEDURE — 93000 ELECTROCARDIOGRAM COMPLETE: CPT | Performed by: NURSE PRACTITIONER

## 2022-01-26 NOTE — PROGRESS NOTES
 losartan (COZAAR) 25 MG tablet Take 1 tablet by mouth daily 90 tablet 0    metoprolol tartrate (LOPRESSOR) 50 MG tablet Take 1 tablet by mouth 2 times daily 180 tablet 0    ticagrelor (BRILINTA) 90 MG TABS tablet Take 1 tablet by mouth 2 times daily 180 tablet 0    potassium chloride (KLOR-CON M) 20 MEQ extended release tablet Take 1 tablet by mouth daily 90 tablet 1    atorvastatin (LIPITOR) 80 MG tablet Take 1 tablet by mouth nightly 90 tablet 3    nitroGLYCERIN (NITROSTAT) 0.4 MG SL tablet up to max of 3 total doses. If no relief after 1 dose, call 911. 25 tablet 3    aspirin 81 MG chewable tablet Take 1 tablet by mouth daily 30 tablet 0     No current facility-administered medications for this visit. Social History     Socioeconomic History    Marital status:      Spouse name: None    Number of children: None    Years of education: None    Highest education level: None   Occupational History    Occupation: F[employer]ING [employer]   Tobacco Use    Smoking status: Former Smoker     Quit date: 10/30/1978     Years since quittin.2    Smokeless tobacco: Current User     Types: Chew    Tobacco comment: quit 39 years ago   Vaping Use    Vaping Use: Never used   Substance and Sexual Activity    Alcohol use: No     Comment: occasional beer    Drug use: No    Sexual activity: Yes   Other Topics Concern    None   Social History Narrative    None     Social Determinants of Health     Financial Resource Strain:     Difficulty of Paying Living Expenses: Not on file   Food Insecurity:     Worried About Running Out of Food in the Last Year: Not on file    Razia of Food in the Last Year: Not on file   Transportation Needs:     Lack of Transportation (Medical): Not on file    Lack of Transportation (Non-Medical):  Not on file   Physical Activity:     Days of Exercise per Week: Not on file    Minutes of Exercise per Session: Not on file   Stress:     Feeling of Stress : Not on file Social Connections:     Frequency of Communication with Friends and Family: Not on file    Frequency of Social Gatherings with Friends and Family: Not on file    Attends Hoahaoism Services: Not on file    Active Member of Clubs or Organizations: Not on file    Attends Club or Organization Meetings: Not on file    Marital Status: Not on file   Intimate Partner Violence:     Fear of Current or Ex-Partner: Not on file    Emotionally Abused: Not on file    Physically Abused: Not on file    Sexually Abused: Not on file   Housing Stability:     Unable to Pay for Housing in the Last Year: Not on file    Number of [place] in the Last Year: Not on file    Unstable Housing in the Last Year: Not on file       Family History   Problem Relation Age of Onset    Coronary Art Dis Father     Cancer Brother     Coronary Art Dis Brother        Blood pressure (!) 142/84, pulse 78, height 5' 7.5\" (1.715 m), weight 254 lb (115.2 kg). General:   Well developed, well nourished, appears well  Lungs:   Clear to auscultation with good aeration throughout  Heart:    Normal S1 S2,  regular rhythm, normal rate. No murmur, rubs, or    gallops  Abdomen:   Obese, Soft, non tender, no organomegalies, positive bowel sounds  Extremities:   No edema, no cyanosis, good peripheral pulses  Neurological:   Awake, alert, oriented. No obvious focal deficits  Musculoskeletal:  No obvious deformities    EKG: SR 78/min. No acute ST/T wave changes. Echo: 10/12/2018  Summary   Normal left ventricle size and moderately reduced systolic function. Ejection fraction was estimated at 40-45%. There was severe hypokinesis of the inferior, lateral, and inferolateral   walls. E/A flow reversal noted. Suggestive of diastolic dysfunction. There was mild mitral regurgitation. Ascending aorta = 3.93 cm. IVC size is within normal limits with normal respiratory phasic changes.     Signature ----------------------------------------------------------------   Electronically signed by Zan Menjivar MD (Interpreting   physician) on 10/12/2018 at 03:22 PM         Diagnosis Orders   1. Dyslipidemia  EKG 12 lead    Lipid, Fasting    Hepatic Function Panel   2. Ischemic cardiomyopathy  EKG 12 lead   3. Coronary artery disease involving native coronary artery of native heart without angina pectoris  EKG 12 lead    ticagrelor (BRILINTA) 90 MG TABS tablet    Echo limited   4. Essential hypertension  EKG 12 lead       Orders Placed This Encounter   Procedures    Lipid, Fasting     Standing Status:   Future     Standing Expiration Date:   1/26/2023    Hepatic Function Panel     Standing Status:   Future     Standing Expiration Date:   1/26/2023    EKG 12 lead     Order Specific Question:   Reason for Exam?     Answer: Other    Echo limited     Standing Status:   Future     Standing Expiration Date:   1/26/2023     Order Specific Question:   Reason for exam:     Answer:   s/p percutaneous coronary angioplasty with stent placement - reassessment of Dave Staton current treatment plan:  No sx angina   No sx fluid overload  Will obtain labs and echo as ordered (follow-up from 2020 that were never completed)    Patient Instructions   Obtain the echocardiogram and labwork as scheduled. Continue all medications including Brilinta. Establish PCP - will provide of list of options for you. Follow-up with Dr. Koki Pedro in one year as scheduled or sooner if need. You may receive a survey regarding the care you received during your visit. Your input is valuable to us. We encourage you to complete and return your survey. We hope you will choose us in the future for your healthcare needs. Return in about 1 year (around 1/26/2023), or if symptoms worsen or fail to improve, for Dr. Koki Pedro.       Michelle Daniels, APRN - CNP

## 2022-01-26 NOTE — PATIENT INSTRUCTIONS
Obtain the echocardiogram and labwork as scheduled. Continue all medications including Brilinta. Establish PCP - will provide of list of options for you. Follow-up with Dr. Melonie Segovia in one year as scheduled or sooner if need. You may receive a survey regarding the care you received during your visit. Your input is valuable to us. We encourage you to complete and return your survey. We hope you will choose us in the future for your healthcare needs.

## 2022-01-26 NOTE — PROGRESS NOTES
Pt here for 1 yr check up     EKG done today     Pt denies chest pain , swelling in legs and feet, heart palpitations    Pt continues with dizziness at times, numbness in left foot , sob on exertion,

## 2022-02-08 ENCOUNTER — HOSPITAL ENCOUNTER (OUTPATIENT)
Age: 72
Discharge: HOME | End: 2022-02-08
Payer: COMMERCIAL

## 2022-02-08 DIAGNOSIS — E78.5 DYSLIPIDEMIA: ICD-10-CM

## 2022-02-08 LAB
ALBUMIN SERPL-MCNC: 4.4 G/DL (ref 3.5–5.1)
ALP BLD-CCNC: 89 U/L (ref 38–126)
ALT SERPL-CCNC: 28 U/L (ref 11–66)
AST SERPL-CCNC: 22 U/L (ref 5–40)
BILIRUB SERPL-MCNC: 0.5 MG/DL (ref 0.3–1.2)
BILIRUBIN DIRECT: < 0.2 MG/DL (ref 0–0.3)
CHOLESTEROL, FASTING: 162 MG/DL (ref 100–199)
HDLC SERPL-MCNC: 40 MG/DL
LDL CHOLESTEROL CALCULATED: 70 MG/DL
TOTAL PROTEIN: 7.7 G/DL (ref 6.1–8)
TRIGLYCERIDE, FASTING: 259 MG/DL (ref 0–199)

## 2022-02-08 PROCEDURE — 36415 COLL VENOUS BLD VENIPUNCTURE: CPT

## 2022-02-08 PROCEDURE — 80076 HEPATIC FUNCTION PANEL: CPT

## 2022-02-08 PROCEDURE — 80061 LIPID PANEL: CPT

## 2022-02-09 ENCOUNTER — TELEPHONE (OUTPATIENT)
Dept: CARDIOLOGY CLINIC | Age: 72
End: 2022-02-09

## 2022-02-09 DIAGNOSIS — Z98.61 CAD S/P PERCUTANEOUS CORONARY ANGIOPLASTY: Primary | ICD-10-CM

## 2022-02-09 DIAGNOSIS — I25.10 CAD S/P PERCUTANEOUS CORONARY ANGIOPLASTY: ICD-10-CM

## 2022-02-09 DIAGNOSIS — I25.10 CAD S/P PERCUTANEOUS CORONARY ANGIOPLASTY: Primary | ICD-10-CM

## 2022-02-09 DIAGNOSIS — I73.9 PAD (PERIPHERAL ARTERY DISEASE) (HCC): ICD-10-CM

## 2022-02-09 DIAGNOSIS — I25.5 ISCHEMIC CARDIOMYOPATHY: ICD-10-CM

## 2022-02-09 DIAGNOSIS — E78.5 DYSLIPIDEMIA: ICD-10-CM

## 2022-02-09 DIAGNOSIS — I10 ESSENTIAL HYPERTENSION: ICD-10-CM

## 2022-02-09 DIAGNOSIS — Z98.61 CAD S/P PERCUTANEOUS CORONARY ANGIOPLASTY: ICD-10-CM

## 2022-02-09 RX ORDER — ICOSAPENT ETHYL 1000 MG/1
2 CAPSULE ORAL 2 TIMES DAILY
Qty: 120 CAPSULE | Refills: 3 | Status: SHIPPED | OUTPATIENT
Start: 2022-02-09

## 2022-02-09 RX ORDER — ICOSAPENT ETHYL 1000 MG/1
2 CAPSULE ORAL 2 TIMES DAILY
Qty: 120 CAPSULE | Refills: 3 | Status: SHIPPED | OUTPATIENT
Start: 2022-02-09 | End: 2022-02-09 | Stop reason: SDUPTHER

## 2022-02-09 NOTE — TELEPHONE ENCOUNTER
Yes - Vascepa as ordered - check pricing and patient to initiate for elevated TG on recent labs. Continue Lipitor as currently taking.    Michelle Daniels, APRN - CNP

## 2022-02-09 NOTE — TELEPHONE ENCOUNTER
VO order from Michelle to start vascepa   Pt notified order printed and faxed to [facility] [phone]    Please agree? olivia

## 2022-02-10 ENCOUNTER — HOSPITAL ENCOUNTER (OUTPATIENT)
Dept: NON INVASIVE DIAGNOSTICS | Age: 72
Discharge: HOME | End: 2022-02-10
Payer: COMMERCIAL

## 2022-02-10 DIAGNOSIS — I25.10 CORONARY ARTERY DISEASE INVOLVING NATIVE CORONARY ARTERY OF NATIVE HEART WITHOUT ANGINA PECTORIS: ICD-10-CM

## 2022-02-10 PROCEDURE — 93307 TTE W/O DOPPLER COMPLETE: CPT

## 2022-02-14 DIAGNOSIS — I25.10 CAD S/P PERCUTANEOUS CORONARY ANGIOPLASTY: ICD-10-CM

## 2022-02-14 DIAGNOSIS — E78.5 DYSLIPIDEMIA: Primary | ICD-10-CM

## 2022-02-14 DIAGNOSIS — Z98.61 CAD S/P PERCUTANEOUS CORONARY ANGIOPLASTY: ICD-10-CM

## 2022-02-14 DIAGNOSIS — I25.10 CORONARY ARTERY DISEASE INVOLVING NATIVE CORONARY ARTERY OF NATIVE HEART WITHOUT ANGINA PECTORIS: ICD-10-CM

## 2022-02-14 NOTE — TELEPHONE ENCOUNTER
Spoke with [facility] and they told me the price was 3.50 for the pt     They have been trying to reach the pt and unable to get ahold of him   I called the pt and gave him the number to [facility] [phone] and he is going to call

## 2022-03-11 RX ORDER — POTASSIUM CHLORIDE 20 MEQ/1
20 TABLET, EXTENDED RELEASE ORAL DAILY
Qty: 90 TABLET | Refills: 2 | Status: SHIPPED | OUTPATIENT
Start: 2022-03-11 | End: 2022-03-22 | Stop reason: SDUPTHER

## 2022-03-22 RX ORDER — ATORVASTATIN CALCIUM 80 MG/1
80 TABLET, FILM COATED ORAL NIGHTLY
Qty: 90 TABLET | Refills: 3 | Status: SHIPPED | OUTPATIENT
Start: 2022-03-22

## 2022-03-22 RX ORDER — POTASSIUM CHLORIDE 20 MEQ/1
20 TABLET, EXTENDED RELEASE ORAL DAILY
Qty: 90 TABLET | Refills: 3 | Status: SHIPPED | OUTPATIENT
Start: 2022-03-22

## 2022-03-22 NOTE — TELEPHONE ENCOUNTER
Sera Britt called requesting a refill on the following medications:      HAS A RX FOR KLOR-CON AT LOCAL PHARMACY, NEEDS RX TO GO TO MAIL ORDER  Requested Prescriptions     Pending Prescriptions Disp Refills    atorvastatin (LIPITOR) 80 MG tablet 90 tablet 3     Sig: Take 1 tablet by mouth nightly    potassium chloride (KLOR-CON M) 20 MEQ extended release tablet 90 tablet 2     Sig: Take 1 tablet by mouth daily     Pharmacy verified:  [facility] order       Date of last visit: 01-26-22  Date of next visit (if applicable): 69-73-09

## 2022-04-05 RX ORDER — LOSARTAN POTASSIUM 25 MG/1
TABLET ORAL
Qty: 90 TABLET | Refills: 1 | Status: SHIPPED | OUTPATIENT
Start: 2022-04-05 | End: 2022-06-20

## 2022-04-05 RX ORDER — METOPROLOL TARTRATE 50 MG/1
TABLET, FILM COATED ORAL
Qty: 180 TABLET | Refills: 1 | Status: SHIPPED | OUTPATIENT
Start: 2022-04-05 | End: 2022-09-06

## 2022-04-05 RX ORDER — FUROSEMIDE 20 MG/1
TABLET ORAL
Qty: 180 TABLET | Refills: 1 | Status: SHIPPED | OUTPATIENT
Start: 2022-04-05 | End: 2022-06-20

## 2022-06-11 DIAGNOSIS — I25.10 CORONARY ARTERY DISEASE INVOLVING NATIVE CORONARY ARTERY OF NATIVE HEART WITHOUT ANGINA PECTORIS: ICD-10-CM

## 2022-06-11 DIAGNOSIS — Z98.61 CAD S/P PERCUTANEOUS CORONARY ANGIOPLASTY: ICD-10-CM

## 2022-06-11 DIAGNOSIS — I25.10 CAD S/P PERCUTANEOUS CORONARY ANGIOPLASTY: ICD-10-CM

## 2022-06-13 RX ORDER — TICAGRELOR 90 MG/1
TABLET ORAL
Qty: 180 TABLET | Refills: 2 | Status: SHIPPED | OUTPATIENT
Start: 2022-06-13

## 2022-06-20 RX ORDER — LOSARTAN POTASSIUM 25 MG/1
TABLET ORAL
Qty: 90 TABLET | Refills: 1 | Status: SHIPPED | OUTPATIENT
Start: 2022-06-20

## 2022-06-20 RX ORDER — FUROSEMIDE 20 MG/1
TABLET ORAL
Qty: 180 TABLET | Refills: 1 | Status: SHIPPED | OUTPATIENT
Start: 2022-06-20

## 2022-09-06 RX ORDER — METOPROLOL TARTRATE 50 MG/1
TABLET, FILM COATED ORAL
Qty: 180 TABLET | Refills: 1 | Status: SHIPPED | OUTPATIENT
Start: 2022-09-06

## 2023-01-09 ENCOUNTER — OFFICE VISIT (OUTPATIENT)
Dept: CARDIOLOGY CLINIC | Age: 73
End: 2023-01-09
Payer: COMMERCIAL

## 2023-01-09 VITALS
WEIGHT: 252 LBS | HEIGHT: 67 IN | HEART RATE: 65 BPM | SYSTOLIC BLOOD PRESSURE: 171 MMHG | BODY MASS INDEX: 39.55 KG/M2 | DIASTOLIC BLOOD PRESSURE: 99 MMHG

## 2023-01-09 DIAGNOSIS — I25.10 CORONARY ARTERY DISEASE INVOLVING NATIVE CORONARY ARTERY OF NATIVE HEART WITHOUT ANGINA PECTORIS: Primary | ICD-10-CM

## 2023-01-09 DIAGNOSIS — I25.10 CAD S/P PERCUTANEOUS CORONARY ANGIOPLASTY: ICD-10-CM

## 2023-01-09 DIAGNOSIS — Z98.61 CAD S/P PERCUTANEOUS CORONARY ANGIOPLASTY: ICD-10-CM

## 2023-01-09 DIAGNOSIS — I10 ESSENTIAL HYPERTENSION: ICD-10-CM

## 2023-01-09 PROCEDURE — 3077F SYST BP >= 140 MM HG: CPT | Performed by: INTERNAL MEDICINE

## 2023-01-09 PROCEDURE — 99213 OFFICE O/P EST LOW 20 MIN: CPT | Performed by: INTERNAL MEDICINE

## 2023-01-09 PROCEDURE — 1123F ACP DISCUSS/DSCN MKR DOCD: CPT | Performed by: INTERNAL MEDICINE

## 2023-01-09 PROCEDURE — 3080F DIAST BP >= 90 MM HG: CPT | Performed by: INTERNAL MEDICINE

## 2023-01-09 PROCEDURE — 93000 ELECTROCARDIOGRAM COMPLETE: CPT | Performed by: INTERNAL MEDICINE

## 2023-01-09 RX ORDER — POTASSIUM CHLORIDE 20 MEQ/1
20 TABLET, EXTENDED RELEASE ORAL DAILY
Qty: 90 TABLET | Refills: 3 | Status: SHIPPED | OUTPATIENT
Start: 2023-01-09

## 2023-01-09 RX ORDER — ATORVASTATIN CALCIUM 80 MG/1
80 TABLET, FILM COATED ORAL NIGHTLY
Qty: 90 TABLET | Refills: 3 | Status: SHIPPED | OUTPATIENT
Start: 2023-01-09

## 2023-01-09 RX ORDER — FUROSEMIDE 20 MG/1
TABLET ORAL
Qty: 180 TABLET | Refills: 3 | Status: SHIPPED | OUTPATIENT
Start: 2023-01-09

## 2023-01-09 RX ORDER — LOSARTAN POTASSIUM 25 MG/1
TABLET ORAL
Qty: 90 TABLET | Refills: 3 | Status: SHIPPED | OUTPATIENT
Start: 2023-01-09

## 2023-01-09 RX ORDER — METOPROLOL TARTRATE 50 MG/1
TABLET, FILM COATED ORAL
Qty: 180 TABLET | Refills: 1 | Status: SHIPPED | OUTPATIENT
Start: 2023-01-09

## 2023-01-09 RX ORDER — NITROGLYCERIN 0.4 MG/1
TABLET SUBLINGUAL
Qty: 25 TABLET | Refills: 3 | Status: SHIPPED | OUTPATIENT
Start: 2023-01-09

## 2023-01-09 NOTE — PROGRESS NOTES
[place] [address] [facility] [address]  Dept: [phone]  Dept Fax: [phone]  Loc: [phone]    Visit Date: 1/9/2023    Mr. Loyd Duenas is a 71 y.o. male  who presented for:  Chief Complaint   Patient presents with    1 Year Follow Up    Cardiomyopathy       HPI:   HPI   72 yo M s/p STEMI 10/2018 with PCI of the LCX to PDA. Has been drinking coffee all day. BP 170s. EF 40-45%--improved to 55-60%. Taking all meds. No bleeding. No chest pain, angina, FLOWER, orthopnea, PND, sob at rest, palpitations, LE edema, or syncope. Current Outpatient Medications:     metoprolol tartrate (LOPRESSOR) 50 MG tablet, TAKE 1 TABLET TWICE DAILY, Disp: 180 tablet, Rfl: 1    furosemide (LASIX) 20 MG tablet, TAKE 1 TABLET TWICE DAILY, Disp: 180 tablet, Rfl: 1    losartan (COZAAR) 25 MG tablet, TAKE 1 TABLET EVERY DAY, Disp: 90 tablet, Rfl: 1    BRILINTA 90 MG TABS tablet, TAKE 1 TABLET TWICE DAILY, Disp: 180 tablet, Rfl: 2    atorvastatin (LIPITOR) 80 MG tablet, Take 1 tablet by mouth nightly, Disp: 90 tablet, Rfl: 3    potassium chloride (KLOR-CON M) 20 MEQ extended release tablet, Take 1 tablet by mouth daily, Disp: 90 tablet, Rfl: 3    nitroGLYCERIN (NITROSTAT) 0.4 MG SL tablet, up to max of 3 total doses. If no relief after 1 dose, call 911., Disp: 25 tablet, Rfl: 3    aspirin 81 MG chewable tablet, Take 1 tablet by mouth daily, Disp: 30 tablet, Rfl: 0    Past Medical History  Bryan Canela  has a past medical history of Arthritis and Hypertension. Social History  Bryan Canela  reports that he has quit smoking. His smokeless tobacco use includes chew. He reports that he does not drink alcohol and does not use drugs. Family History  Bryan Canela family history includes Cancer in his brother; Coronary Art Dis in his brother and father.     There is no family history of bicuspid aortic valve, aneurysms, heart transplant, pacemakers, defibrillators, or sudden cardiac death. Past Surgical History   Past Surgical History:   Procedure Laterality Date    APPENDECTOMY         Review of Systems   Constitutional: Negative for chills and fever  HENT: Negative for congestion, sinus pressure, sneezing and sore throat. Eyes: Negative for pain, discharge, redness and itching. Respiratory: Negative for apnea, cough  Gastrointestinal: Negative for blood in stool, constipation, diarrhea   Endocrine: Negative for cold intolerance, heat intolerance, polydipsia. Genitourinary: Negative for dysuria, enuresis, flank pain and hematuria. Musculoskeletal: Negative for arthralgias, joint swelling and neck pain. Neurological: Negative for numbness and headaches. Psychiatric/Behavioral: Negative for agitation, confusion, decreased concentration and dysphoric mood. Objective:     BP (!) 171/99   Pulse 65   Ht 5' 7\" (1.702 m)   Wt 252 lb (114.3 kg)   BMI 39.47 kg/m²     Wt Readings from Last 3 Encounters:   01/09/23 252 lb (114.3 kg)   01/26/22 254 lb (115.2 kg)   11/09/20 247 lb 12.8 oz (112.4 kg)     BP Readings from Last 3 Encounters:   01/09/23 (!) 171/99   01/26/22 (!) 142/84   11/16/20 (!) 183/106       Nursing note and vitals reviewed. Physical Exam   Constitutional: Oriented to person, place, and time. Appears well-developed and well-nourished. HENT:   Head: Normocephalic and atraumatic. Eyes: EOM are normal. Pupils are equal, round, and reactive to light. Neck: Normal range of motion. Neck supple. No JVD present. Cardiovascular: Normal rate, regular rhythm, normal heart sounds and intact distal pulses. No murmur heard. Pulmonary/Chest: Effort normal and breath sounds normal. No respiratory distress. No wheezes. No rales. Abdominal: Soft. Bowel sounds are normal. No distension. There is no tenderness. Musculoskeletal: Normal range of motion. No edema. Neurological: Alert and oriented to person, place, and time. No cranial nerve deficit. Coordination normal.   Skin: Skin is warm and dry. Psychiatric: Normal mood and affect. No results found for: CKTOTAL, CKMB, CKMBINDEX    Lab Results   Component Value Date/Time    WBC 9.3 10/12/2018 03:46 AM    RBC 5.92 10/12/2018 03:46 AM    HGB 16.0 10/12/2018 03:46 AM    HCT 49.2 10/12/2018 03:46 AM    MCV 83.1 10/12/2018 03:46 AM    MCH 27.0 10/12/2018 03:46 AM    MCHC 32.5 10/12/2018 03:46 AM     10/12/2018 03:46 AM    MPV 9.7 10/12/2018 03:46 AM       Lab Results   Component Value Date/Time     04/11/2019 10:43 AM    K 4.9 04/11/2019 10:43 AM    CL 99 04/11/2019 10:43 AM    CO2 27 04/11/2019 10:43 AM    BUN 25 04/11/2019 10:43 AM    LABALBU 4.4 02/08/2022 02:10 PM    CREATININE 1.4 04/11/2019 10:43 AM    CALCIUM 9.4 04/11/2019 10:43 AM    LABGLOM 50 04/11/2019 10:43 AM    GLUCOSE 86 04/11/2019 10:43 AM       Lab Results   Component Value Date/Time    ALKPHOS 89 02/08/2022 02:10 PM    ALT 28 02/08/2022 02:10 PM    AST 22 02/08/2022 02:10 PM    PROT 7.7 02/08/2022 02:10 PM    BILITOT 0.5 02/08/2022 02:10 PM    BILIDIR <0.2 02/08/2022 02:10 PM    LABALBU 4.4 02/08/2022 02:10 PM       Lab Results   Component Value Date/Time    MG 2.0 10/12/2018 03:46 AM       Lab Results   Component Value Date    INR 0.98 10/12/2018         No results found for: LABA1C    Lab Results   Component Value Date/Time    HDL 40 02/08/2022 02:10 PM    LDLCALC 70 02/08/2022 02:10 PM       No results found for: TSH      Testing Reviewed:      I have individually reviewed the cardiac test below:    ECHO: No results found for this or any previous visit. Assessment/Plan   S/p STEMI with PCI of the LCX to PDA, 10/2018  ICM, EF 40-45%--> improved to 50-55%. HTN  CKD  Coffee related HTN and when he comes to the physician's office. He will need to get a BP cuff and monitor. Last LDL 70. Continue current meds. He has used NTG 3-4 x in the last couple years. RF management. Continue GDMT.   Discussed diet/exercise/BP/weight loss/health lifestyle choices/lipids; the patient understands the goals and will try to comply.     Disposition:  1 year           Electronically signed by Colonel Kina MD   1/9/2023 at 3:14 PM EST

## 2023-06-21 RX ORDER — METOPROLOL TARTRATE 50 MG/1
TABLET, FILM COATED ORAL
Qty: 180 TABLET | Refills: 1 | Status: SHIPPED | OUTPATIENT
Start: 2023-06-21

## 2023-10-29 DIAGNOSIS — Z98.61 CAD S/P PERCUTANEOUS CORONARY ANGIOPLASTY: ICD-10-CM

## 2023-10-29 DIAGNOSIS — I25.10 CORONARY ARTERY DISEASE INVOLVING NATIVE CORONARY ARTERY OF NATIVE HEART WITHOUT ANGINA PECTORIS: ICD-10-CM

## 2023-10-29 DIAGNOSIS — I25.10 CAD S/P PERCUTANEOUS CORONARY ANGIOPLASTY: ICD-10-CM

## 2023-10-30 RX ORDER — FUROSEMIDE 20 MG/1
TABLET ORAL
Qty: 180 TABLET | Refills: 1 | Status: SHIPPED | OUTPATIENT
Start: 2023-10-30

## 2023-10-30 RX ORDER — LOSARTAN POTASSIUM 25 MG/1
TABLET ORAL
Qty: 90 TABLET | Refills: 1 | Status: SHIPPED | OUTPATIENT
Start: 2023-10-30

## 2024-01-02 RX ORDER — POTASSIUM CHLORIDE 20 MEQ/1
20 TABLET, EXTENDED RELEASE ORAL DAILY
Qty: 90 TABLET | Refills: 0 | Status: SHIPPED | OUTPATIENT
Start: 2024-01-02

## 2024-01-02 RX ORDER — ATORVASTATIN CALCIUM 80 MG/1
80 TABLET, FILM COATED ORAL NIGHTLY
Qty: 90 TABLET | Refills: 0 | Status: SHIPPED | OUTPATIENT
Start: 2024-01-02

## 2024-01-09 ENCOUNTER — OFFICE VISIT (OUTPATIENT)
Dept: CARDIOLOGY CLINIC | Age: 74
End: 2024-01-09
Payer: COMMERCIAL

## 2024-01-09 VITALS
SYSTOLIC BLOOD PRESSURE: 138 MMHG | BODY MASS INDEX: 38.34 KG/M2 | HEIGHT: 68 IN | HEART RATE: 71 BPM | DIASTOLIC BLOOD PRESSURE: 78 MMHG | WEIGHT: 253 LBS

## 2024-01-09 DIAGNOSIS — E78.5 DYSLIPIDEMIA: ICD-10-CM

## 2024-01-09 DIAGNOSIS — Z98.61 CAD S/P PERCUTANEOUS CORONARY ANGIOPLASTY: ICD-10-CM

## 2024-01-09 DIAGNOSIS — I25.10 CORONARY ARTERY DISEASE INVOLVING NATIVE CORONARY ARTERY OF NATIVE HEART WITHOUT ANGINA PECTORIS: Primary | ICD-10-CM

## 2024-01-09 DIAGNOSIS — I25.10 CAD S/P PERCUTANEOUS CORONARY ANGIOPLASTY: ICD-10-CM

## 2024-01-09 DIAGNOSIS — I10 ESSENTIAL HYPERTENSION: ICD-10-CM

## 2024-01-09 PROCEDURE — 3078F DIAST BP <80 MM HG: CPT | Performed by: NURSE PRACTITIONER

## 2024-01-09 PROCEDURE — 93000 ELECTROCARDIOGRAM COMPLETE: CPT | Performed by: NURSE PRACTITIONER

## 2024-01-09 PROCEDURE — 3075F SYST BP GE 130 - 139MM HG: CPT | Performed by: NURSE PRACTITIONER

## 2024-01-09 PROCEDURE — 1123F ACP DISCUSS/DSCN MKR DOCD: CPT | Performed by: NURSE PRACTITIONER

## 2024-01-09 PROCEDURE — 99214 OFFICE O/P EST MOD 30 MIN: CPT | Performed by: NURSE PRACTITIONER

## 2024-01-09 NOTE — PATIENT INSTRUCTIONS
Continue current medications as prescribed.    Try to move more; stay as active as you can - start easy as we talked about.     Eat heart healthy diet.    Follow-up with your PCP as scheduled.    Follow-up with Dr. Francis or Michelle CANTU as scheduled or sooner if need.

## 2024-01-09 NOTE — PROGRESS NOTES
[facility]  [facility] CARDIOLOGY  [address]  [address]  [facility] [address]  Dept: [phone]  Dept Fax: [phone]  Loc: [phone]    Visit Date: 1/9/2024    Primary Cardiologist: Javed Francis MD      Mr. Dubose is a 73 y.o. male  who presented for: 1 year follow-up      No chief complaint on file.      HPI:   HPI   Last seen in office on 1/9/2023 per Dr. Francis. Per office note:  71 yo M s/p STEMI 10/2018 with PCI of the LCX to PDA.  Has been drinking coffee all day.  BP 170s.  EF 40-45%--improved to 55-60%.  Taking all meds.  No bleeding.  No chest pain, angina, FLOWER, orthopnea, PND, sob at rest, palpitations, LE edema, or syncope.    Assessment/Plan   S/p STEMI with PCI of the LCX to PDA, 10/2018  ICM, EF 40-45%--> improved to 50-55%.  HTN  CKD  Coffee related HTN and when he comes to the physician's office.  He will need to get a BP cuff and monitor.  Last LDL 70.  Continue current meds.  He has used NTG 3-4 x in the last couple years.  RF management.  Continue GDMT.  Discussed diet/exercise/BP/weight loss/health lifestyle choices/lipids; the patient understands the goals and will try to comply.  Disposition: 1 year        Current Outpatient Medications:     atorvastatin (LIPITOR) 80 MG tablet, TAKE 1 TABLET EVERY NIGHT, Disp: 90 tablet, Rfl: 0    potassium chloride (KLOR-CON M) 20 MEQ extended release tablet, TAKE 1 TABLET EVERY DAY, Disp: 90 tablet, Rfl: 0    furosemide (LASIX) 20 MG tablet, TAKE 1 TABLET TWICE DAILY, Disp: 180 tablet, Rfl: 1    losartan (COZAAR) 25 MG tablet, TAKE 1 TABLET EVERY DAY, Disp: 90 tablet, Rfl: 1    ticagrelor (BRILINTA) 90 MG TABS tablet, TAKE 1 TABLET TWICE DAILY, Disp: 180 tablet, Rfl: 1    metoprolol tartrate (LOPRESSOR) 50 MG tablet, TAKE 1 TABLET TWICE DAILY, Disp: 180 tablet, Rfl: 1    nitroGLYCERIN (NITROSTAT) 0.4 MG SL tablet, up to max of 3 total doses. If no relief after 1 dose, call 911., Disp: 25 tablet, Rfl:

## 2024-01-29 RX ORDER — METOPROLOL TARTRATE 50 MG/1
TABLET, FILM COATED ORAL
Qty: 180 TABLET | Refills: 3 | Status: SHIPPED | OUTPATIENT
Start: 2024-01-29

## 2024-03-20 RX ORDER — ATORVASTATIN CALCIUM 80 MG/1
80 TABLET, FILM COATED ORAL NIGHTLY
Qty: 90 TABLET | Refills: 3 | Status: SHIPPED | OUTPATIENT
Start: 2024-03-20

## 2024-03-20 RX ORDER — POTASSIUM CHLORIDE 20 MEQ/1
20 TABLET, EXTENDED RELEASE ORAL DAILY
Qty: 90 TABLET | Refills: 3 | Status: SHIPPED | OUTPATIENT
Start: 2024-03-20

## 2024-03-24 DIAGNOSIS — I25.10 CAD S/P PERCUTANEOUS CORONARY ANGIOPLASTY: ICD-10-CM

## 2024-03-24 DIAGNOSIS — Z98.61 CAD S/P PERCUTANEOUS CORONARY ANGIOPLASTY: ICD-10-CM

## 2024-03-24 DIAGNOSIS — I25.10 CORONARY ARTERY DISEASE INVOLVING NATIVE CORONARY ARTERY OF NATIVE HEART WITHOUT ANGINA PECTORIS: ICD-10-CM

## 2024-03-27 RX ORDER — FUROSEMIDE 20 MG/1
TABLET ORAL
Qty: 180 TABLET | Refills: 3 | Status: SHIPPED | OUTPATIENT
Start: 2024-03-27

## 2024-03-27 RX ORDER — LOSARTAN POTASSIUM 25 MG/1
TABLET ORAL
Qty: 90 TABLET | Refills: 3 | Status: SHIPPED | OUTPATIENT
Start: 2024-03-27

## 2024-11-18 RX ORDER — METOPROLOL TARTRATE 50 MG
TABLET ORAL
Qty: 180 TABLET | Refills: 0 | Status: SHIPPED | OUTPATIENT
Start: 2024-11-18

## 2025-01-08 ENCOUNTER — OFFICE VISIT (OUTPATIENT)
Dept: CARDIOLOGY CLINIC | Age: 75
End: 2025-01-08
Payer: MEDICARE

## 2025-01-08 VITALS
HEART RATE: 74 BPM | HEIGHT: 68 IN | DIASTOLIC BLOOD PRESSURE: 98 MMHG | WEIGHT: 255.8 LBS | SYSTOLIC BLOOD PRESSURE: 146 MMHG | BODY MASS INDEX: 38.77 KG/M2

## 2025-01-08 DIAGNOSIS — E78.5 DYSLIPIDEMIA: ICD-10-CM

## 2025-01-08 DIAGNOSIS — I25.10 CORONARY ARTERY DISEASE INVOLVING NATIVE CORONARY ARTERY OF NATIVE HEART WITHOUT ANGINA PECTORIS: Primary | ICD-10-CM

## 2025-01-08 DIAGNOSIS — I10 ESSENTIAL HYPERTENSION: ICD-10-CM

## 2025-01-08 PROCEDURE — 3077F SYST BP >= 140 MM HG: CPT | Performed by: STUDENT IN AN ORGANIZED HEALTH CARE EDUCATION/TRAINING PROGRAM

## 2025-01-08 PROCEDURE — 1159F MED LIST DOCD IN RCRD: CPT | Performed by: STUDENT IN AN ORGANIZED HEALTH CARE EDUCATION/TRAINING PROGRAM

## 2025-01-08 PROCEDURE — 4004F PT TOBACCO SCREEN RCVD TLK: CPT | Performed by: STUDENT IN AN ORGANIZED HEALTH CARE EDUCATION/TRAINING PROGRAM

## 2025-01-08 PROCEDURE — 99214 OFFICE O/P EST MOD 30 MIN: CPT | Performed by: STUDENT IN AN ORGANIZED HEALTH CARE EDUCATION/TRAINING PROGRAM

## 2025-01-08 PROCEDURE — 3017F COLORECTAL CA SCREEN DOC REV: CPT | Performed by: STUDENT IN AN ORGANIZED HEALTH CARE EDUCATION/TRAINING PROGRAM

## 2025-01-08 PROCEDURE — G8417 CALC BMI ABV UP PARAM F/U: HCPCS | Performed by: STUDENT IN AN ORGANIZED HEALTH CARE EDUCATION/TRAINING PROGRAM

## 2025-01-08 PROCEDURE — G8427 DOCREV CUR MEDS BY ELIG CLIN: HCPCS | Performed by: STUDENT IN AN ORGANIZED HEALTH CARE EDUCATION/TRAINING PROGRAM

## 2025-01-08 PROCEDURE — M1308 PR FLU IMMUNIZE NO ADMIN: HCPCS | Performed by: STUDENT IN AN ORGANIZED HEALTH CARE EDUCATION/TRAINING PROGRAM

## 2025-01-08 PROCEDURE — 93000 ELECTROCARDIOGRAM COMPLETE: CPT | Performed by: STUDENT IN AN ORGANIZED HEALTH CARE EDUCATION/TRAINING PROGRAM

## 2025-01-08 PROCEDURE — 1123F ACP DISCUSS/DSCN MKR DOCD: CPT | Performed by: STUDENT IN AN ORGANIZED HEALTH CARE EDUCATION/TRAINING PROGRAM

## 2025-01-08 PROCEDURE — 3080F DIAST BP >= 90 MM HG: CPT | Performed by: STUDENT IN AN ORGANIZED HEALTH CARE EDUCATION/TRAINING PROGRAM

## 2025-01-08 NOTE — PATIENT INSTRUCTIONS
Check blood pressure and keep a log for 1-2 weeks and update.    You may receive a survey regarding the care you received during your visit. We encourage you to complete and return your survey, as your input is valuable to us. We hope you will choose us in the future for your healthcare needs. Thank you!    Your Medical Assistant today: Damion Horton  Thank you for coming to our office! It was a pleasure to serve you.

## 2025-01-08 NOTE — PROGRESS NOTES
1 year follow up.    EKG done today.    Denies chest pain, palpitations, dizziness, shortness of breath, and edema.     No cardiac concerns at this time.       
with DR Francis as scheduled or sooner if needed.    Discussed use, benefit, and side effects of prescribed medications. All patient questions answered. Pt voiced understanding. Instructed to continue current medications, diet and exercise. Continue risk factor modification and medical management. Patient agreed with treatment plan. Follow up as directed.    Electronically signedby Connor Mortimer, PA-C on 1/8/2025 at 11:09 AM

## 2025-01-15 DIAGNOSIS — I25.10 CORONARY ARTERY DISEASE INVOLVING NATIVE CORONARY ARTERY OF NATIVE HEART WITHOUT ANGINA PECTORIS: ICD-10-CM

## 2025-01-15 DIAGNOSIS — I25.10 CAD S/P PERCUTANEOUS CORONARY ANGIOPLASTY: ICD-10-CM

## 2025-01-15 DIAGNOSIS — Z98.61 CAD S/P PERCUTANEOUS CORONARY ANGIOPLASTY: ICD-10-CM

## 2025-01-15 RX ORDER — FUROSEMIDE 20 MG/1
TABLET ORAL
Qty: 180 TABLET | Refills: 3 | Status: SHIPPED | OUTPATIENT
Start: 2025-01-15

## 2025-01-15 RX ORDER — LOSARTAN POTASSIUM 25 MG/1
TABLET ORAL
Qty: 90 TABLET | Refills: 3 | Status: SHIPPED | OUTPATIENT
Start: 2025-01-15

## 2025-01-15 RX ORDER — ATORVASTATIN CALCIUM 80 MG/1
80 TABLET, FILM COATED ORAL NIGHTLY
Qty: 90 TABLET | Refills: 3 | Status: SHIPPED | OUTPATIENT
Start: 2025-01-15

## 2025-01-15 RX ORDER — POTASSIUM CHLORIDE 1500 MG/1
20 TABLET, EXTENDED RELEASE ORAL DAILY
Qty: 90 TABLET | Refills: 3 | Status: SHIPPED | OUTPATIENT
Start: 2025-01-15

## 2025-01-30 RX ORDER — METOPROLOL TARTRATE 50 MG
TABLET ORAL
Qty: 180 TABLET | Refills: 3 | Status: SHIPPED | OUTPATIENT
Start: 2025-01-30